# Patient Record
Sex: FEMALE | Race: WHITE | Employment: PART TIME | URBAN - NONMETROPOLITAN AREA
[De-identification: names, ages, dates, MRNs, and addresses within clinical notes are randomized per-mention and may not be internally consistent; named-entity substitution may affect disease eponyms.]

---

## 2017-04-28 ENCOUNTER — TRANSFERRED RECORDS (OUTPATIENT)
Dept: HEALTH INFORMATION MANAGEMENT | Facility: HOSPITAL | Age: 52
End: 2017-04-28

## 2017-11-09 DIAGNOSIS — R39.15 URINARY URGENCY: ICD-10-CM

## 2017-11-09 NOTE — TELEPHONE ENCOUNTER
ciprofloxacin      Last Written Prescription Date: 10/17/16  Last Fill Quantity: 12,  # refills: 3   Last Office Visit with G, [facility] or [facility] prescribing provider: 10/17/16

## 2017-11-13 RX ORDER — CIPROFLOXACIN 250 MG/1
TABLET, FILM COATED ORAL
Qty: 12 TABLET | Refills: 3 | Status: SHIPPED | OUTPATIENT
Start: 2017-11-13 | End: 2019-01-21

## 2017-11-13 NOTE — TELEPHONE ENCOUNTER
Patient is due for annual fu.  Ciprofloxacin        Routing refill request to provider for review/approval because:  Drug not on the [facility], [facility] or [facility] refill protocol or controlled substance

## 2017-11-20 DIAGNOSIS — E03.9 ACQUIRED HYPOTHYROIDISM: ICD-10-CM

## 2017-11-20 DIAGNOSIS — F41.9 ANXIETY: ICD-10-CM

## 2017-11-21 RX ORDER — CITALOPRAM HYDROBROMIDE 40 MG/1
TABLET ORAL
Qty: 30 TABLET | Refills: 0 | Status: SHIPPED | OUTPATIENT
Start: 2017-11-21 | End: 2017-12-30

## 2017-11-21 RX ORDER — LEVOTHYROXINE SODIUM 75 UG/1
TABLET ORAL
Qty: 30 TABLET | Refills: 11 | Status: SHIPPED | OUTPATIENT
Start: 2017-11-21 | End: 2018-01-18

## 2017-11-21 NOTE — TELEPHONE ENCOUNTER
Refill request for Synthroid  Last refill 11/10/16 , qty #30 with 11 refills  Last office visit 10/2016 with TSH    Patient is due for fu visit and lab:  TSH    Thank you, Dr. Kruger for addressing this for Rosetta Suarez who is out of the office today.

## 2017-11-26 ENCOUNTER — HEALTH MAINTENANCE LETTER (OUTPATIENT)
Age: 52
End: 2017-11-26

## 2017-12-30 DIAGNOSIS — F41.9 ANXIETY: ICD-10-CM

## 2018-01-02 NOTE — TELEPHONE ENCOUNTER
celexa  Last Written Prescription Date: 11/21/17  Last Fill Quantity: 30,  # refills: 0   Last Office Visit with G, [facility] or [facility] prescribing provider: 10/17/16

## 2018-01-03 RX ORDER — CITALOPRAM HYDROBROMIDE 40 MG/1
TABLET ORAL
Qty: 30 TABLET | Refills: 0 | Status: SHIPPED | OUTPATIENT
Start: 2018-01-03 | End: 2018-01-18

## 2018-01-03 NOTE — TELEPHONE ENCOUNTER
Donnella  Please see note below.  Patient due for office visit and phq.  PHQ-9 SCORE 11/13/2014 10/1/2015 10/17/2016   Total Score 11 - -   Total Score - 10 9     Please advise.  Thank you.

## 2018-01-08 ENCOUNTER — TELEPHONE (OUTPATIENT)
Dept: FAMILY MEDICINE | Facility: OTHER | Age: 53
End: 2018-01-08

## 2018-01-08 NOTE — TELEPHONE ENCOUNTER
Left message to call pharmacy for refill, will see upcoming appt scheduled and refill will not be an issue.  Sana Jansen LPN

## 2018-01-08 NOTE — TELEPHONE ENCOUNTER
8:31 AM    Reason for Call: Phone Call    Description:  Patient would like to see if she can get her rx for her thyroid and anti depressant pills refilled - she made an appt for 1/18/18 at 2.    Was an appointment offered for this call? Yes  If yes : Appointment type              Date    Preferred method for responding to this message: Telephone Call  What is your phone number ? [phone]    If we cannot reach you directly, may we leave a detailed response at the number you provided? Yes    Can this message wait until your PCP/provider returns, if available today? YES,     Rita Simons    \

## 2018-01-18 ENCOUNTER — OFFICE VISIT (OUTPATIENT)
Dept: FAMILY MEDICINE | Facility: OTHER | Age: 53
End: 2018-01-18
Attending: PHYSICIAN ASSISTANT
Payer: COMMERCIAL

## 2018-01-18 VITALS
DIASTOLIC BLOOD PRESSURE: 76 MMHG | HEART RATE: 70 BPM | SYSTOLIC BLOOD PRESSURE: 124 MMHG | BODY MASS INDEX: 33.64 KG/M2 | TEMPERATURE: 98.6 F | WEIGHT: 218 LBS | OXYGEN SATURATION: 97 %

## 2018-01-18 DIAGNOSIS — E03.9 ACQUIRED HYPOTHYROIDISM: ICD-10-CM

## 2018-01-18 DIAGNOSIS — M19.90 OSTEOARTHRITIS, UNSPECIFIED OSTEOARTHRITIS TYPE, UNSPECIFIED SITE: ICD-10-CM

## 2018-01-18 DIAGNOSIS — F33.1 MODERATE EPISODE OF RECURRENT MAJOR DEPRESSIVE DISORDER (H): Primary | ICD-10-CM

## 2018-01-18 DIAGNOSIS — Z79.811 LONG TERM CURRENT USE OF AROMATASE INHIBITOR: ICD-10-CM

## 2018-01-18 LAB
ALBUMIN SERPL-MCNC: 3.6 G/DL (ref 3.4–5)
ALP SERPL-CCNC: 141 U/L (ref 40–150)
ALT SERPL W P-5'-P-CCNC: 28 U/L (ref 0–50)
ANION GAP SERPL CALCULATED.3IONS-SCNC: 7 MMOL/L (ref 3–14)
AST SERPL W P-5'-P-CCNC: 22 U/L (ref 0–45)
BASOPHILS # BLD AUTO: 0 10E9/L (ref 0–0.2)
BASOPHILS NFR BLD AUTO: 0.2 %
BILIRUB SERPL-MCNC: 0.2 MG/DL (ref 0.2–1.3)
BUN SERPL-MCNC: 9 MG/DL (ref 7–30)
CALCIUM SERPL-MCNC: 8.4 MG/DL (ref 8.5–10.1)
CHLORIDE SERPL-SCNC: 105 MMOL/L (ref 94–109)
CO2 SERPL-SCNC: 30 MMOL/L (ref 20–32)
CREAT SERPL-MCNC: 0.65 MG/DL (ref 0.52–1.04)
DIFFERENTIAL METHOD BLD: NORMAL
EOSINOPHIL # BLD AUTO: 0.1 10E9/L (ref 0–0.7)
EOSINOPHIL NFR BLD AUTO: 1.5 %
ERYTHROCYTE [DISTWIDTH] IN BLOOD BY AUTOMATED COUNT: 11.9 % (ref 10–15)
GFR SERPL CREATININE-BSD FRML MDRD: >90 ML/MIN/1.7M2
GLUCOSE SERPL-MCNC: 79 MG/DL (ref 70–99)
HCT VFR BLD AUTO: 36 % (ref 35–47)
HGB BLD-MCNC: 12.5 G/DL (ref 11.7–15.7)
IMM GRANULOCYTES # BLD: 0 10E9/L (ref 0–0.4)
IMM GRANULOCYTES NFR BLD: 0.3 %
LYMPHOCYTES # BLD AUTO: 2.4 10E9/L (ref 0.8–5.3)
LYMPHOCYTES NFR BLD AUTO: 39.2 %
MCH RBC QN AUTO: 31.3 PG (ref 26.5–33)
MCHC RBC AUTO-ENTMCNC: 34.7 G/DL (ref 31.5–36.5)
MCV RBC AUTO: 90 FL (ref 78–100)
MONOCYTES # BLD AUTO: 0.5 10E9/L (ref 0–1.3)
MONOCYTES NFR BLD AUTO: 8 %
NEUTROPHILS # BLD AUTO: 3.1 10E9/L (ref 1.6–8.3)
NEUTROPHILS NFR BLD AUTO: 50.8 %
NRBC # BLD AUTO: 0 10*3/UL
NRBC BLD AUTO-RTO: 0 /100
PLATELET # BLD AUTO: 225 10E9/L (ref 150–450)
POTASSIUM SERPL-SCNC: 3.7 MMOL/L (ref 3.4–5.3)
PROT SERPL-MCNC: 7.3 G/DL (ref 6.8–8.8)
RBC # BLD AUTO: 3.99 10E12/L (ref 3.8–5.2)
SODIUM SERPL-SCNC: 142 MMOL/L (ref 133–144)
TSH SERPL DL<=0.005 MIU/L-ACNC: 2.46 MU/L (ref 0.4–4)
WBC # BLD AUTO: 6.1 10E9/L (ref 4–11)

## 2018-01-18 PROCEDURE — 36415 COLL VENOUS BLD VENIPUNCTURE: CPT | Performed by: PHYSICIAN ASSISTANT

## 2018-01-18 PROCEDURE — 99214 OFFICE O/P EST MOD 30 MIN: CPT | Performed by: PHYSICIAN ASSISTANT

## 2018-01-18 PROCEDURE — 80050 GENERAL HEALTH PANEL: CPT | Performed by: PHYSICIAN ASSISTANT

## 2018-01-18 RX ORDER — TRAZODONE HYDROCHLORIDE 50 MG/1
50 TABLET, FILM COATED ORAL
Qty: 90 TABLET | Refills: 1 | Status: SHIPPED | OUTPATIENT
Start: 2018-01-18 | End: 2018-06-27

## 2018-01-18 RX ORDER — LEVOTHYROXINE SODIUM 75 UG/1
TABLET ORAL
Qty: 30 TABLET | Refills: 11 | Status: SHIPPED | OUTPATIENT
Start: 2018-01-18 | End: 2018-06-27

## 2018-01-18 RX ORDER — ESCITALOPRAM OXALATE 20 MG/1
40 TABLET ORAL DAILY
Qty: 180 TABLET | Refills: 1 | Status: SHIPPED | OUTPATIENT
Start: 2018-01-18 | End: 2018-06-27

## 2018-01-18 ASSESSMENT — ANXIETY QUESTIONNAIRES
1. FEELING NERVOUS, ANXIOUS, OR ON EDGE: NEARLY EVERY DAY
5. BEING SO RESTLESS THAT IT IS HARD TO SIT STILL: MORE THAN HALF THE DAYS
7. FEELING AFRAID AS IF SOMETHING AWFUL MIGHT HAPPEN: SEVERAL DAYS
GAD7 TOTAL SCORE: 13
IF YOU CHECKED OFF ANY PROBLEMS ON THIS QUESTIONNAIRE, HOW DIFFICULT HAVE THESE PROBLEMS MADE IT FOR YOU TO DO YOUR WORK, TAKE CARE OF THINGS AT HOME, OR GET ALONG WITH OTHER PEOPLE: VERY DIFFICULT
3. WORRYING TOO MUCH ABOUT DIFFERENT THINGS: SEVERAL DAYS
2. NOT BEING ABLE TO STOP OR CONTROL WORRYING: MORE THAN HALF THE DAYS
6. BECOMING EASILY ANNOYED OR IRRITABLE: MORE THAN HALF THE DAYS
4. TROUBLE RELAXING: MORE THAN HALF THE DAYS

## 2018-01-18 ASSESSMENT — PATIENT HEALTH QUESTIONNAIRE - PHQ9: SUM OF ALL RESPONSES TO PHQ QUESTIONS 1-9: 17

## 2018-01-18 ASSESSMENT — PAIN SCALES - GENERAL: PAINLEVEL: NO PAIN (0)

## 2018-01-18 NOTE — MR AVS SNAPSHOT
After Visit Summary   1/18/2018    Izzy Shultz    MRN: [id number]           Patient Information     Date Of Birth          1965        Visit Information        Provider Department      1/18/2018 2:15 PM Rosetta Suarez PA [facility]        Today's Diagnoses     Moderate episode of recurrent major depressive disorder (H)    -  1    Acquired hypothyroidism        Osteoarthritis, unspecified osteoarthritis type, unspecified site        Long term current use of aromatase inhibitor          Care Instructions      Thank you for choosing [facility].   I have office hours 8:00 am to 4:30 pm on Monday's, Wednesday's, Thursday's and Friday's. My nurse and I are out of the office every Tuesday.    Following your visit, when your labs and diagnostic testing have returned, I will review then and you will be contacted by my nurse.  If you are on My Chart, you can also view results there.    For refills, notify your pharmacy regarding what you need and the pharmacy will generate a refill request. Do not call my nurse as she is unable to process refill request. Please plan ahead and allow 3-5 days for refill requests.    You will generally receive a reminder call the day prior to your appointment.  If you cannot attend your appointment, please cancel your appointment with as much notice as possible.  If there is a pattern of failure to present for your appointments, I cannot provide consistent, meaningful, ongoing care for you. It is very important to me that you come in for your care, so we can best assist you with your health care needs.    IMPORTANT:  Please note that it is my standard of practice to NOT participate in prescribing ongoing requested Narcotic Analgesic therapy, and/or participate in the prescribing of other controlled substances.  My nurse and I am happy to assist you with the process of referral for alternative pain management as needed, and other treatment  modalities including but not limited to:  Physical Therapy, Physical Medicine and Rehab, Counseling, Chiropractic Care, Orthopedic Care, and non-narcotic medication management.     In the event that you need to be seen for emergent concerns and I am out of office,  please see one of my colleagues for acute concerns.  You may also present to  or ER.  I appreciate the opportunity to serve you and look forward to supporting your healthcare needs in the future. Please contact me with any questions or concerns that you may have.    Sincerely,      Rosetta Suarez RN, PA-C               Follow-ups after your visit        Who to contact     If you have questions or need follow up information about today's clinic visit or your schedule please contact [facility] directly at [phone].  Normal or non-critical lab and imaging results will be communicated to you by MyChart, letter or phone within 4 business days after the clinic has received the results. If you do not hear from us within 7 days, please contact the clinic through [employer] or phone. If you have a critical or abnormal lab result, we will notify you by phone as soon as possible.  Submit refill requests through [employer] or call your pharmacy and they will forward the refill request to us. Please allow 3 business days for your refill to be completed.          Additional Information About Your Visit        [employer] Information     [employer] gives you secure access to your electronic health record. If you see a primary care provider, you can also send messages to your care team and make appointments. If you have questions, please call your primary care clinic.  If you do not have a primary care provider, please call [phone] and they will assist you.        Care EveryWhere ID     This is your Care EveryWhere ID. This could be used by other organizations to access your [place] medical records  [id number]        Your Vitals Were     Pulse Temperature  Pulse Oximetry Breastfeeding? BMI (Body Mass Index)       70 98.6  F (37  C) (Tympanic) 97% No 33.64 kg/m2        Blood Pressure from Last 3 Encounters:   01/18/18 124/76   12/05/16 138/77   10/17/16 108/70    Weight from Last 3 Encounters:   01/18/18 218 lb (98.9 kg)   10/17/16 223 lb (101.2 kg)   10/03/16 223 lb (101.2 kg)              We Performed the Following     CBC with platelets and differential     Comprehensive metabolic panel (BMP + Alb, Alk Phos, ALT, AST, Total. Bili, TP)     TSH with free T4 reflex          Today's Medication Changes          These changes are accurate as of: 1/18/18  3:51 PM.  If you have any questions, ask your nurse or doctor.               Start taking these medicines.        Dose/Directions    escitalopram 20 MG tablet   Commonly known as:  LEXAPRO   Used for:  Moderate episode of recurrent major depressive disorder (H)   Started by:  Rosetta Suarez PA        Dose:  40 mg   Take 2 tablets (40 mg) by mouth daily   Quantity:  180 tablet   Refills:  1       traZODone 50 MG tablet   Commonly known as:  DESYREL   Used for:  Moderate episode of recurrent major depressive disorder (H)   Started by:  Rosetta Suarez PA        Dose:  50 mg   Take 1 tablet (50 mg) by mouth nightly as needed for sleep   Quantity:  90 tablet   Refills:  1         Stop taking these medicines if you haven't already. Please contact your care team if you have questions.     citalopram 40 MG tablet   Commonly known as:  celeXA   Stopped by:  Rosetta Suarez PA                Where to get your medicines      These medications were sent to [facility] - [place], [place] - [address]  [address] [address]     Phone:  [phone]     escitalopram 20 MG tablet    traZODone 50 MG tablet                Primary Care Provider Office Phone # Fax #    JOSE Raoms [phone]       [facility] [address] [place] [address]        Equal Access to  Services     [facility]: Hadii aad ku hadhusambalbir Lucynick, wabeatriceda luqadaha, qaybta kaalmamary denny, moses floyd . So [facility] [phone].    ATENCIÓN: Si mitchell lea, tiene a lang disposición servicios gratuitos de asistencia lingüística. Llame al [phone].    We comply with applicable federal civil rights laws and [place] laws. We do not discriminate on the basis of race, color, national origin, age, disability, sex, sexual orientation, or gender identity.            Thank you!     Thank you for choosing [facility]  for your care. Our goal is always to provide you with excellent care. Hearing back from our patients is one way we can continue to improve our services. Please take a few minutes to complete the written survey that you may receive in the mail after your visit with us. Thank you!             Your Updated Medication List - Protect others around you: Learn how to safely use, store and throw away your medicines at www.disposemymeds.org.          This list is accurate as of: 1/18/18  3:51 PM.  Always use your most recent med list.                   Brand Name Dispense Instructions for use Diagnosis    anastrozole 1 MG tablet    ARIMIDEX     Take 1 mg by mouth daily        CALCIUM + D + K PO      Take  by mouth. 3 pills daily        ciprofloxacin 250 MG tablet    CIPRO    12 tablet    TAKE ONE TABLET BY MOUTH POST COITAL    Urinary urgency       escitalopram 20 MG tablet    LEXAPRO    180 tablet    Take 2 tablets (40 mg) by mouth daily    Moderate episode of recurrent major depressive disorder (H)       ferrous sulfate 325 (65 FE) MG tablet    IRON     Take 325 mg by mouth daily (with breakfast)        ibuprofen 800 MG tablet    ADVIL/MOTRIN    30 tablet    Take 1 tablet (800 mg) by mouth every 8 hours as needed for pain    Osteoarthritis, unspecified osteoarthritis type, unspecified site       IRON 100 PLUS PO      Take  by mouth.        levothyroxine 75 MCG  tablet    SYNTHROID/LEVOTHROID    30 tablet    TAKE 1 TABLET BY MOUTH EVERY DAY ON AN EMPTY STOMACH    Acquired hypothyroidism       LORazepam 0.5 MG tablet    ATIVAN    20 tablet    Take 1 tablet (0.5 mg) by mouth nightly as needed for other (sleep)    Primary insomnia       MULTIVITAMIN PO      Take  by mouth.        Na Sulfate-K Sulfate-Mg Sulf solution    SUPREP BOWEL PREP    2 Bottle    Take 177 mLs (1 Bottle) by mouth 2 times daily        traZODone 50 MG tablet    DESYREL    90 tablet    Take 1 tablet (50 mg) by mouth nightly as needed for sleep    Moderate episode of recurrent major depressive disorder (H)       VITAMIN B 12 PO      Take  by mouth.        vitamin D 2000 UNITS tablet      Take 2,000 Units by mouth daily

## 2018-01-18 NOTE — NURSING NOTE
"Chief Complaint   Patient presents with     Anxiety     Depression     Thyroid Problem       Initial /76 (BP Location: Right arm, Patient Position: Chair, Cuff Size: Adult Large)  Pulse 70  Temp 98.6  F (37  C) (Tympanic)  Wt 218 lb (98.9 kg)  SpO2 97%  Breastfeeding? No  BMI 33.64 kg/m2 Estimated body mass index is 33.64 kg/(m^2) as calculated from the following:    Height as of 10/17/16: 5' 7.5\" (1.715 m).    Weight as of this encounter: 218 lb (98.9 kg).  Medication Reconciliation: complete   Denise Vick CMA(AAMA)   "

## 2018-01-18 NOTE — PATIENT INSTRUCTIONS
Thank you for choosing [facility].   I have office hours 8:00 am to 4:30 pm on Monday's, Wednesday's, Thursday's and Friday's. My nurse and I are out of the office every Tuesday.    Following your visit, when your labs and diagnostic testing have returned, I will review then and you will be contacted by my nurse.  If you are on My Chart, you can also view results there.    For refills, notify your pharmacy regarding what you need and the pharmacy will generate a refill request. Do not call my nurse as she is unable to process refill request. Please plan ahead and allow 3-5 days for refill requests.    You will generally receive a reminder call the day prior to your appointment.  If you cannot attend your appointment, please cancel your appointment with as much notice as possible.  If there is a pattern of failure to present for your appointments, I cannot provide consistent, meaningful, ongoing care for you. It is very important to me that you come in for your care, so we can best assist you with your health care needs.    IMPORTANT:  Please note that it is my standard of practice to NOT participate in prescribing ongoing requested Narcotic Analgesic therapy, and/or participate in the prescribing of other controlled substances.  My nurse and I am happy to assist you with the process of referral for alternative pain management as needed, and other treatment modalities including but not limited to:  Physical Therapy, Physical Medicine and Rehab, Counseling, Chiropractic Care, Orthopedic Care, and non-narcotic medication management.     In the event that you need to be seen for emergent concerns and I am out of office,  please see one of my colleagues for acute concerns.  You may also present to  or ER.  I appreciate the opportunity to serve you and look forward to supporting your healthcare needs in the future. Please contact me with any questions or concerns that you may  have.    Sincerely,      Rosetta Suarez RN, PA-C

## 2018-01-18 NOTE — PROGRESS NOTES
SUBJECTIVE:   Izzy Shultz is a 52 year old female who presents to clinic today for the following health issues:        Depression and Anxiety Follow-Up    Status since last visit: Worsened     Other associated symptoms:None    Complicating factors:     Significant life event: No     Current substance abuse: None    PHQ-9 Score and MyChart F/U Questions 10/1/2015 10/17/2016 1/18/2018   Total Score 10 9 17   Q9: Suicide Ideation Not at all Not at all Not at all     MITCH-7 SCORE 1/18/2018   Total Score 13       PHQ-9  English  PHQ-9   Any Language  GAD7  Suicide Assessment Five-step Evaluation and Treatment (SAFE-T)      Amount of exercise or physical activity: everyday activity     Problems taking medications regularly: No    Medication side effects: none    Diet: regular (no restrictions)        Hypothyroidism Follow-up      Since last visit, patient describes the following symptoms: Weight stable, no hair loss, no skin changes, no constipation, no loose stools        Problem list and histories reviewed & adjusted, as indicated.  Additional history: as documented    Patient Active Problem List   Diagnosis     Obesity     Menorrhagia     Hypothyroidism     H/O partial thyroidectomy     Osteoarthritis     Overweight     Headache     Edema     Depression     Use of aromatase inhibitors     Disorder of joint     Long term current use of aromatase inhibitor     ACP (advance care planning)     Past Surgical History:   Procedure Laterality Date     COLONOSCOPY N/A 12/5/2016    Procedure: COLONOSCOPY;  Surgeon: Ju Valentino MD;  Location: [facility] OR     D & C  1996     ENT SURGERY      tubes     gastric bypass  2009     HYSTEROSCOPY, ABLATE ENDOMETRIUM HYDROTHERMAL, COMBINED  7/31/2013    Procedure: COMBINED HYSTEROSCOPY, ABLATE ENDOMETRIUM HYDROTHERMAL;  HYSTEROSCOPY, ENDOMETRIAL ABLATION;  Surgeon: Vasquez Barahona MD;  Location: [facility] OR     knee laparoscopy  1989    bilateral     lap band  2007     MANDIBLE SURGERY       done to severe overbite - [place]in [place]     MASTECTOMY, BILATERAL        x2       right thyroid lobectomy and isthmus  2012     TONSILLECTOMY      adenoidectomy       Social History   Substance Use Topics     Smoking status: Never Smoker     Smokeless tobacco: Never Used      Comment: no passive exposure     Alcohol use Yes      Comment: rarely     Family History   Problem Relation Age of Onset     CANCER Father      lung     CANCER Mother      thyroid     DIABETES Mother      Other - See Comments Mother      AAA/hyperlipidemia     Hypertension Mother      Hypertension Father      Hypertension Sister      Thyroid Disease Sister      Endocrine Disease Brother      thyroid     Other - See Comments Brother      sleep apnea     Family History Negative Sister      Hypertension Brother          Current Outpatient Prescriptions   Medication Sig Dispense Refill     citalopram (CELEXA) 40 MG tablet TAKE ONE TABLET BY MOUTH EVERY DAY 30 tablet 0     levothyroxine (SYNTHROID/LEVOTHROID) 75 MCG tablet TAKE 1 TABLET BY MOUTH EVERY DAY ON AN EMPTY STOMACH 30 tablet 11     ciprofloxacin (CIPRO) 250 MG tablet TAKE ONE TABLET BY MOUTH POST COITAL 12 tablet 3     ibuprofen (ADVIL,MOTRIN) 800 MG tablet Take 1 tablet (800 mg) by mouth every 8 hours as needed for pain 30 tablet 1     LORazepam (ATIVAN) 0.5 MG tablet Take 1 tablet (0.5 mg) by mouth nightly as needed for other (sleep) 20 tablet 0     Na Sulfate-K Sulfate-Mg Sulf (SUPREP BOWEL PREP) solution Take 177 mLs (1 Bottle) by mouth 2 times daily 2 Bottle 0     ferrous sulfate (IRON) 325 (65 FE) MG tablet Take 325 mg by mouth daily (with breakfast)        anastrozole (ARIMIDEX) 1 MG tablet Take 1 mg by mouth daily        Cholecalciferol (VITAMIN D) 2000 UNITS tablet Take 2,000 Units by mouth daily        Multiple Vitamins-Minerals (MULTIVITAMIN OR) Take  by mouth.       Iron-Vit C-Vit B12-Folic Acid (IRON 100 PLUS PO) Take  by mouth.       Calcium-Vitamin D-Vitamin K  (CALCIUM + D + K PO) Take  by mouth. 3 pills daily       Cyanocobalamin (VITAMIN B 12 PO) Take  by mouth.       Allergies   Allergen Reactions     Tylox [Oxycodone-Acetaminophen] Anaphylaxis     Hives all over, throat swells     Cats      Congestion     Codeine      Throat swells.  No reaction to SQ hydromorphone x 1 in OR 11/4/14     BP Readings from Last 3 Encounters:   01/18/18 124/76   12/05/16 138/77   10/17/16 108/70    Wt Readings from Last 3 Encounters:   01/18/18 218 lb (98.9 kg)   10/17/16 223 lb (101.2 kg)   10/03/16 223 lb (101.2 kg)                        Review[facility] and [facility]  Meds       Reviewed and updated as needed this visit by Provider         ROS:  Constitutional, neuro, ENT, endocrine, pulmonary, cardiac, gastrointestinal, genitourinary, musculoskeletal, integument and psychiatric systems are negative, except as otherwise noted.      OBJECTIVE:                                                    /76 (BP Location: Right arm, Patient Position: Chair, Cuff Size: Adult Large)  Pulse 70  Temp 98.6  F (37  C) (Tympanic)  Wt 218 lb (98.9 kg)  SpO2 97%  Breastfeeding? No  BMI 33.64 kg/m2  Body mass index is 33.64 kg/(m^2).  GENERAL APPEARANCE: healthy, alert and no distress  EYES: Eyes grossly normal to inspection, PERRL and conjunctivae and sclerae normal  HENT: ear canals and TM's normal and nose and mouth without ulcers or lesions  NECK: no adenopathy, no asymmetry, masses, or scars and thyroid normal to palpation  RESP: lungs clear to auscultation - no rales, rhonchi or wheezes  CV: regular rates and rhythm, normal S1 S2, no S3 or S4 and no murmur, click or rub  LYMPHATICS: normal ant/post cervical and supraclavicular nodes  ABDOMEN: soft, nontender, without hepatosplenomegaly or masses and bowel sounds normal  MS: extremities normal- no gross deformities noted  SKIN: no suspicious lesions or rashes  NEURO: Normal strength and tone,  mentation intact and speech normal  PSYCH: mentation appears normal and affect normal/bright. Long discussion on stressors. PHQ and MITCH scores.  On the edge, not sleeping. Addressed today.     Diagnostic test results:  Diagnostic Test Results:  Results for orders placed or performed in visit on 01/18/18 (from the past 24 hour(s))   TSH with free T4 reflex   Result Value Ref Range    TSH 2.46 0.40 - 4.00 mU/L   CBC with platelets and differential   Result Value Ref Range    WBC 6.1 4.0 - 11.0 10e9/L    RBC Count 3.99 3.8 - 5.2 10e12/L    Hemoglobin 12.5 11.7 - 15.7 g/dL    Hematocrit 36.0 35.0 - 47.0 %    MCV 90 78 - 100 fl    MCH 31.3 26.5 - 33.0 pg    MCHC 34.7 31.5 - 36.5 g/dL    RDW 11.9 10.0 - 15.0 %    Platelet Count 225 150 - 450 10e9/L    Diff Method Automated Method     % Neutrophils 50.8 %    % Lymphocytes 39.2 %    % Monocytes 8.0 %    % Eosinophils 1.5 %    % Basophils 0.2 %    % Immature Granulocytes 0.3 %    Nucleated RBCs 0 0 /100    Absolute Neutrophil 3.1 1.6 - 8.3 10e9/L    Absolute Lymphocytes 2.4 0.8 - 5.3 10e9/L    Absolute Monocytes 0.5 0.0 - 1.3 10e9/L    Absolute Eosinophils 0.1 0.0 - 0.7 10e9/L    Absolute Basophils 0.0 0.0 - 0.2 10e9/L    Abs Immature Granulocytes 0.0 0 - 0.4 10e9/L    Absolute Nucleated RBC 0.0    Comprehensive metabolic panel (BMP + Alb, Alk Phos, ALT, AST, Total. Bili, TP)   Result Value Ref Range    Sodium 142 133 - 144 mmol/L    Potassium 3.7 3.4 - 5.3 mmol/L    Chloride 105 94 - 109 mmol/L    Carbon Dioxide 30 20 - 32 mmol/L    Anion Gap 7 3 - 14 mmol/L    Glucose 79 70 - 99 mg/dL    Urea Nitrogen 9 7 - 30 mg/dL    Creatinine 0.65 0.52 - 1.04 mg/dL    GFR Estimate >90 >60 mL/min/1.7m2    GFR Estimate If Black >90 >60 mL/min/1.7m2    Calcium 8.4 (L) 8.5 - 10.1 mg/dL    Bilirubin Total 0.2 0.2 - 1.3 mg/dL    Albumin 3.6 3.4 - 5.0 g/dL    Protein Total 7.3 6.8 - 8.8 g/dL    Alkaline Phosphatase 141 40 - 150 U/L    ALT 28 0 - 50 U/L    AST 22 0 - 45 U/L           ASSESSMENT/PLAN:                                                    .1. Moderate episode of recurrent major depressive disorder (H)  New job, new changes,  with a job change.  She is dealing with a lot.  Change in medications.  Will let me know how things are going.   - TSH with free T4 reflex  - escitalopram (LEXAPRO) 20 MG tablet; Take 2 tablets (40 mg) by mouth daily  Dispense: 180 tablet; Refill: 1  - traZODone (DESYREL) 50 MG tablet; Take 1 tablet (50 mg) by mouth nightly as needed for sleep  Dispense: 90 tablet; Refill: 1    2. Acquired hypothyroidism  Recheck . On medications If in range ok for one year.   - TSH with free T4 reflex    3. Osteoarthritis, unspecified osteoarthritis type, unspecified site  Chronic discomfort.  taking NSAID for this. With relief.   - CBC with platelets and differential  - Comprehensive metabolic panel (BMP + Alb, Alk Phos, ALT, AST, Total. Bili, TP)    4. Long term current use of aromatase inhibitor  Seeing on oncology every 3 months and moving up to 6 months.       See Patient Instructions    JOSE Madera  [facility]ON

## 2018-01-19 ASSESSMENT — ANXIETY QUESTIONNAIRES: GAD7 TOTAL SCORE: 13

## 2018-02-05 ENCOUNTER — TELEPHONE (OUTPATIENT)
Dept: FAMILY MEDICINE | Facility: OTHER | Age: 53
End: 2018-02-05

## 2018-02-05 NOTE — TELEPHONE ENCOUNTER
Called [facility]; message left that PA was denied.  Will have to look at options.  Rita Solorio

## 2018-02-05 NOTE — TELEPHONE ENCOUNTER
3:03 PM    Reason for Call: Phone Call    Description: Reji from [facility] pharmacy is calling in to see if we have the PA for the rx escitalopram.    Was an appointment offered for this call? No  If yes : Appointment type              Date    Preferred method for responding to this message: Telephone Call  What is your phone number ? [phone]    If we cannot reach you directly, may we leave a detailed response at the number you provided? Yes    Can this message wait until your PCP/provider returns, if available today? YES,     Rita Simons

## 2018-02-07 ENCOUNTER — TELEPHONE (OUTPATIENT)
Dept: FAMILY MEDICINE | Facility: OTHER | Age: 53
End: 2018-02-07

## 2018-02-07 DIAGNOSIS — F33.1 MODERATE EPISODE OF RECURRENT MAJOR DEPRESSIVE DISORDER (H): Primary | ICD-10-CM

## 2018-02-07 RX ORDER — ESCITALOPRAM OXALATE 20 MG/1
20 TABLET ORAL DAILY
Qty: 90 TABLET | Refills: 1 | Status: SHIPPED | OUTPATIENT
Start: 2018-02-07 | End: 2018-11-12

## 2018-02-07 RX ORDER — CITALOPRAM HYDROBROMIDE 20 MG/1
20 TABLET ORAL DAILY
Qty: 90 TABLET | Refills: 1 | Status: SHIPPED | OUTPATIENT
Start: 2018-02-07 | End: 2019-03-04

## 2018-02-07 NOTE — TELEPHONE ENCOUNTER
Response to prior auth on Lexapro; denied by [facility] as there is no documentation of pt receiving starting dose of 20mg daily.  Per Rosetta Suarez, pt can have Lexapro 20 mg; add Celexa 20 mg daily.  Pt left phone message; will get orders ready for CWallis to sign/route to pharmacy.  Rita Solorio

## 2018-02-08 ENCOUNTER — TELEPHONE (OUTPATIENT)
Dept: FAMILY MEDICINE | Facility: OTHER | Age: 53
End: 2018-02-08

## 2018-02-08 NOTE — TELEPHONE ENCOUNTER
Pt notified of the Celexa 20 mg/Lexapro 20 mg in substitution of Lexapro 40 mg daily that insurance denied coverage for.  She is ok with the change.  Rita Solorio

## 2018-02-08 NOTE — TELEPHONE ENCOUNTER
12:43 PM    Reason for Call: Phone Call    Description: Patient is returning a call from the nurse who called yesterday. If she could please get a call back today.     Was an appointment offered for this call? No  If yes : Appointment type              Date    Preferred method for responding to this message: Telephone Call  What is your phone number ? [phone]    If we cannot reach you directly, may we leave a detailed response at the number you provided? Yes    Can this message wait until your PCP/provider returns, if available today? YES,     Rita Simons

## 2018-02-15 ENCOUNTER — TELEPHONE (OUTPATIENT)
Dept: FAMILY MEDICINE | Facility: OTHER | Age: 53
End: 2018-02-15

## 2018-06-18 ENCOUNTER — TELEPHONE (OUTPATIENT)
Dept: FAMILY MEDICINE | Facility: OTHER | Age: 53
End: 2018-06-18

## 2018-06-18 NOTE — TELEPHONE ENCOUNTER
Please offer 4:00 appt with Rosetta Suarez on 6/27. Please make this a 40 min visit.  Sana Jansen LPN

## 2018-06-18 NOTE — TELEPHONE ENCOUNTER
10:58 AM    Reason for Call: OVERBOOK    Patient is having the following symptoms: Med check    The patient is requesting an appointment for before end of June with Rosetta Suarez (pt will lose ins at end of June)    Was an appointment offered for this call? Yes  If yes : Appointment type short               Date  07/02/18    Preferred method for responding to this message: Telephone Call  What is your phone number ?  [phone]    If we cannot reach you directly, may we leave a detailed response at the number you provided? Yes    Can this message wait until your PCP/provider returns, if unavailable today? Not applicable    Amparo Keenan

## 2018-06-20 PROBLEM — M85.80 OSTEOPENIA: Status: ACTIVE | Noted: 2017-04-28

## 2018-06-20 NOTE — PROGRESS NOTES
SUBJECTIVE:   Izzy Shultz is a 53 year old female who presents to clinic today for the following health issues:    Depression and Anxiety Follow-Up    Status since last visit: No change    Other associated symptoms:None    Complicating factors:     Significant life event: No     Current substance abuse: None    PHQ-9 10/17/2016 1/18/2018 6/27/2018   Total Score 9 17 14   Q9: Suicide Ideation Not at all Not at all Not at all     MITCH-7 SCORE 1/18/2018 6/27/2018   Total Score 13 8       PHQ-9  English  PHQ-9   Any Language  MITCH-7  Suicide Assessment Five-step Evaluation and Treatment (SAFE-T)  Hypothyroidism Follow-up      Since last visit, patient describes the following symptoms: Weight stable, no hair loss, no skin changes, no constipation, no loose stools      Amount of exercise or physical activity: Doing 8 to 59242 steps a day.    Problems taking medications regularly: No    Medication side effects: none    Diet: regular (no restrictions)            Problem list and histories reviewed & adjusted, as indicated.  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)     Osteopenia     Status post bariatric surgery     Past Surgical History:   Procedure Laterality Date     COLONOSCOPY N/A 12/5/2016    Procedure: COLONOSCOPY;  Surgeon: Ju Valentino MD;  Location: [facility]     D & C  1996     ENT SURGERY      tubes     gastric bypass  2009     HYSTEROSCOPY, ABLATE ENDOMETRIUM HYDROTHERMAL, COMBINED  7/31/2013    Procedure: COMBINED HYSTEROSCOPY, ABLATE ENDOMETRIUM HYDROTHERMAL;  HYSTEROSCOPY, ENDOMETRIAL ABLATION;  Surgeon: Jun  Vasquez BLOUNT MD;  Location: [facility] OR     knee laparoscopy      bilateral     lap band       MANDIBLE SURGERY      done to severe overbite - -in [place]     MASTECTOMY, BILATERAL Bilateral       x2       right thyroid lobectomy and isthmus  2012     TONSILLECTOMY      adenoidectomy       Social History   Substance Use Topics     Smoking status: Never Smoker     Smokeless tobacco: Never Used      Comment: no passive exposure     Alcohol use Yes      Comment: rarely     Family History   Problem Relation Age of Onset     Cancer Father      lung     Hypertension Father      Cancer Mother      thyroid     Diabetes Mother      Other - See Comments Mother      AAA/hyperlipidemia     Hypertension Mother      Hypertension Sister      Thyroid Disease Sister      Endocrine Disease Brother      thyroid     Other - See Comments Brother      sleep apnea     Family History Negative Sister      Hypertension Brother          Current Outpatient Prescriptions   Medication Sig Dispense Refill     alendronate (FOSAMAX) 35 MG tablet Take 35 mg by mouth       anastrozole (ARIMIDEX) 1 MG tablet Take 1 mg by mouth daily        Calcium-Vitamin D-Vitamin K (CALCIUM + D + K PO) Take  by mouth. 3 pills daily       Cholecalciferol (VITAMIN D) 2000 UNITS tablet Take 2,000 Units by mouth daily        ciprofloxacin (CIPRO) 250 MG tablet TAKE ONE TABLET BY MOUTH POST COITAL 12 tablet 3     citalopram (CELEXA) 20 MG tablet Take 1 tablet (20 mg) by mouth daily 90 tablet 1     Cyanocobalamin (VITAMIN B 12 PO) Take  by mouth.       escitalopram (LEXAPRO) 20 MG tablet Take 1 tablet (20 mg) by mouth daily 90 tablet 1     ferrous sulfate (IRON) 325 (65 FE) MG tablet Take 325 mg by mouth daily (with breakfast)        ibuprofen (ADVIL,MOTRIN) 800 MG tablet Take 1 tablet (800 mg) by mouth every 8 hours as needed for pain 30 tablet 1     Iron-Vit C-Vit B12-Folic Acid (IRON 100 PLUS PO) Take  by mouth.       levothyroxine (SYNTHROID/LEVOTHROID) 75 MCG  tablet TAKE 1 TABLET BY MOUTH EVERY DAY ON AN EMPTY STOMACH 30 tablet 11     LORazepam (ATIVAN) 0.5 MG tablet Take 1 tablet (0.5 mg) by mouth nightly as needed for other (sleep) 20 tablet 0     Multiple Vitamins-Minerals (MULTIVITAMIN OR) Take  by mouth.       nystatin (MYCOSTATIN) cream Apply topically 3 times daily for 14 days 30 g 1     [DISCONTINUED] escitalopram (LEXAPRO) 20 MG tablet Take 2 tablets (40 mg) by mouth daily 180 tablet 1     Allergies   Allergen Reactions     Tylox [Oxycodone-Acetaminophen] Anaphylaxis     Hives all over, throat swells     Cats      Congestion     Codeine      Throat swells.  No reaction to SQ hydromorphone x 1 in OR 11/4/14     Recent Labs   Lab Test  06/27/18   1728  01/18/18   1558   10/27/14   0908   06/06/14   0827   05/31/13   0735   LDL  169*   --    --    --    --   157*   --   122   HDL  51   --    --    --    --   58   --   46   TRIG  81   --    --    --    --   58   --   59   ALT  21  28   --   24   < >  25   --    --    CR  0.66  0.65   < >  0.68   --   0.76   < >   --    GFRESTIMATED  >90  >90   < >  >90  Non  GFR Calc     --   81   < >   --    GFRESTBLACK  >90  >90   < >  >90   GFR Calc     --   >90   < >   --    POTASSIUM  3.8  3.7   < >  4.1   --   4.0   < >   --    TSH  1.84  2.46   < >  2.58   < >  3.77   < >   --     < > = values in this interval not displayed.      BP Readings from Last 3 Encounters:   06/27/18 130/70   01/18/18 124/76   12/05/16 138/77    Wt Readings from Last 3 Encounters:   06/27/18 221 lb (100.2 kg)   01/18/18 218 lb (98.9 kg)   10/17/16 223 lb (101.2 kg)                    Reviewed and updated as needed this visit by clinical staff  Tobacco  Allergies  Meds  Med Hx  Surg Hx  Fam Hx  Soc Hx      Reviewed and updated as needed this visit by Provider         ROS:  Constitutional, neuro, ENT, endocrine, pulmonary, cardiac, gastrointestinal, genitourinary, musculoskeletal, integument and psychiatric systems  "are negative, except as otherwise noted.    OBJECTIVE:                                                    /70  Pulse 68  Temp 97.9  F (36.6  C)  Ht 5' 6\" (1.676 m)  Wt 221 lb (100.2 kg)  SpO2 96%  BMI 35.67 kg/m2  Body mass index is 35.67 kg/(m^2).  GENERAL APPEARANCE: healthy, alert and mild distress  EYES: Eyes grossly normal to inspection, PERRL and conjunctivae and sclerae normal  HENT: ear canals and TM's normal and nose and mouth without ulcers or lesions  NECK: no adenopathy, no asymmetry, masses, or scars and thyroid normal to palpation  RESP: lungs clear to auscultation - no rales, rhonchi or wheezes  BREAST: no palpable axillary masses or adenopathy and bilateral breast implants scar clear and has great no redness.   CV: regular rates and rhythm, normal S1 S2, no S3 or S4 and no murmur, click or rub  LYMPHATICS: no cervical adenopathy  ABDOMEN: soft, nontender, without hepatosplenomegaly or masses and bowel sounds normal   (female): normal cervix, adnexae, and uterus without masses or discharge, rectal exam normal without masses-guaiac negative stool and cystocele present. Pap smear done  MS: extremities normal- no gross deformities noted  SKIN: redness in her groin to labia without odor.   NEURO: Normal strength and tone, mentation intact and speech normal  PSYCH: mentation appears normal and very stressed. Discussion.     Diagnostic test results:  Diagnostic Test Results:  Results for orders placed or performed in visit on 06/27/18 (from the past 24 hour(s))   Lipid Profile   Result Value Ref Range    Cholesterol 236 (H) <200 mg/dL    Triglycerides 81 <150 mg/dL    HDL Cholesterol 51 >49 mg/dL    LDL Cholesterol Calculated 169 (H) <100 mg/dL    Non HDL Cholesterol 185 (H) <130 mg/dL   Comprehensive metabolic panel   Result Value Ref Range    Sodium 140 133 - 144 mmol/L    Potassium 3.8 3.4 - 5.3 mmol/L    Chloride 105 94 - 109 mmol/L    Carbon Dioxide 29 20 - 32 mmol/L    Anion Gap 6 3 - 14 " mmol/L    Glucose 96 70 - 99 mg/dL    Urea Nitrogen 12 7 - 30 mg/dL    Creatinine 0.66 0.52 - 1.04 mg/dL    GFR Estimate >90 >60 mL/min/1.7m2    GFR Estimate If Black >90 >60 mL/min/1.7m2    Calcium 8.8 8.5 - 10.1 mg/dL    Bilirubin Total 0.2 0.2 - 1.3 mg/dL    Albumin 3.6 3.4 - 5.0 g/dL    Protein Total 7.3 6.8 - 8.8 g/dL    Alkaline Phosphatase 140 40 - 150 U/L    ALT 21 0 - 50 U/L    AST 18 0 - 45 U/L   CBC with platelets differential   Result Value Ref Range    WBC 6.4 4.0 - 11.0 10e9/L    RBC Count 3.97 3.8 - 5.2 10e12/L    Hemoglobin 12.3 11.7 - 15.7 g/dL    Hematocrit 35.7 35.0 - 47.0 %    MCV 90 78 - 100 fl    MCH 31.0 26.5 - 33.0 pg    MCHC 34.5 31.5 - 36.5 g/dL    RDW 12.0 10.0 - 15.0 %    Platelet Count 207 150 - 450 10e9/L    Diff Method Automated Method     % Neutrophils 55.9 %    % Lymphocytes 34.4 %    % Monocytes 7.2 %    % Eosinophils 1.9 %    % Basophils 0.3 %    % Immature Granulocytes 0.3 %    Nucleated RBCs 0 0 /100    Absolute Neutrophil 3.6 1.6 - 8.3 10e9/L    Absolute Lymphocytes 2.2 0.8 - 5.3 10e9/L    Absolute Monocytes 0.5 0.0 - 1.3 10e9/L    Absolute Eosinophils 0.1 0.0 - 0.7 10e9/L    Absolute Basophils 0.0 0.0 - 0.2 10e9/L    Abs Immature Granulocytes 0.0 0 - 0.4 10e9/L    Absolute Nucleated RBC 0.0    TSH with free T4 reflex   Result Value Ref Range    TSH 1.84 0.40 - 4.00 mU/L   UA with Microscopic reflex to Culture   Result Value Ref Range    Color Urine Light Yellow     Appearance Urine Clear     Glucose Urine Negative NEG^Negative mg/dL    Bilirubin Urine Negative NEG^Negative    Ketones Urine Negative NEG^Negative mg/dL    Specific Gravity Urine 1.006 1.003 - 1.035    Blood Urine Negative NEG^Negative    pH Urine 6.5 4.7 - 8.0 pH    Protein Albumin Urine Negative NEG^Negative mg/dL    Urobilinogen mg/dL Normal 0.0 - 2.0 mg/dL    Nitrite Urine Negative NEG^Negative    Leukocyte Esterase Urine Moderate (A) NEG^Negative    Source Midstream Urine     WBC Urine 3 0 - 5 /HPF    RBC  Urine <1 0 - 2 /HPF    Bacteria Urine None (A) NEG^Negative /HPF    Mucous Urine Present (A) NEG^Negative /LPF        ASSESSMENT/PLAN:                                                    1. Acquired hypothyroidism  Needing this done. Will then refill medications.   - TSH with free T4 reflex    2. Screening for human papillomavirus  Due for this. Hx of having breast cancer.  Had ablation. No longer gets cycle. Ovaries remain.   - A pap thin layer diagnostic with HPV (select HPV order below)    3. Encounter for Papanicolaou smear for cervical cancer screening  Done with HPV typing.  If ok good for 5 years. Same partner and low risk. No sx.   - A pap thin layer diagnostic with HPV (select HPV order below)    4. Status post bariatric surgery  Labs are due. Feeling well. Has maintained weight loss. Taking vitamins.   - UA with Microscopic reflex to Culture  - Lipid Profile  - Comprehensive metabolic panel  - CBC with platelets differential  - Vitamin B12    5. Screening for human immunodeficiency virus  due  - HIV Antigen Antibody Combo    6. Encounter for HCV screening test for low risk patient  due  - Hepatitis C Screen Reflex to HCV RNA Quant and Genotype    7. Yeast dermatitis  Given cream.   - nystatin (MYCOSTATIN) cream; Apply topically 3 times daily for 14 days  Dispense: 30 g; Refill: 1        See Patient Instructions    JOSE Madera  [facility]IBJARON

## 2018-06-27 ENCOUNTER — OFFICE VISIT (OUTPATIENT)
Dept: FAMILY MEDICINE | Facility: OTHER | Age: 53
End: 2018-06-27
Attending: PHYSICIAN ASSISTANT
Payer: COMMERCIAL

## 2018-06-27 VITALS
WEIGHT: 221 LBS | SYSTOLIC BLOOD PRESSURE: 130 MMHG | HEIGHT: 66 IN | TEMPERATURE: 97.9 F | DIASTOLIC BLOOD PRESSURE: 70 MMHG | OXYGEN SATURATION: 96 % | HEART RATE: 68 BPM | BODY MASS INDEX: 35.52 KG/M2

## 2018-06-27 DIAGNOSIS — R82.90 ABNORMAL URINE FINDINGS: ICD-10-CM

## 2018-06-27 DIAGNOSIS — Z11.51 SCREENING FOR HUMAN PAPILLOMAVIRUS: ICD-10-CM

## 2018-06-27 DIAGNOSIS — E03.9 ACQUIRED HYPOTHYROIDISM: Primary | ICD-10-CM

## 2018-06-27 DIAGNOSIS — Z11.59 ENCOUNTER FOR HCV SCREENING TEST FOR LOW RISK PATIENT: ICD-10-CM

## 2018-06-27 DIAGNOSIS — B37.2 YEAST DERMATITIS: ICD-10-CM

## 2018-06-27 DIAGNOSIS — Z12.4 ENCOUNTER FOR PAPANICOLAOU SMEAR FOR CERVICAL CANCER SCREENING: ICD-10-CM

## 2018-06-27 DIAGNOSIS — Z23 ENCOUNTER FOR IMMUNIZATION: ICD-10-CM

## 2018-06-27 DIAGNOSIS — Z98.84 STATUS POST BARIATRIC SURGERY: ICD-10-CM

## 2018-06-27 DIAGNOSIS — Z11.4 SCREENING FOR HUMAN IMMUNODEFICIENCY VIRUS: ICD-10-CM

## 2018-06-27 LAB
ALBUMIN SERPL-MCNC: 3.6 G/DL (ref 3.4–5)
ALBUMIN UR-MCNC: NEGATIVE MG/DL
ALP SERPL-CCNC: 140 U/L (ref 40–150)
ALT SERPL W P-5'-P-CCNC: 21 U/L (ref 0–50)
ANION GAP SERPL CALCULATED.3IONS-SCNC: 6 MMOL/L (ref 3–14)
APPEARANCE UR: CLEAR
AST SERPL W P-5'-P-CCNC: 18 U/L (ref 0–45)
BACTERIA #/AREA URNS HPF: ABNORMAL /HPF
BASOPHILS # BLD AUTO: 0 10E9/L (ref 0–0.2)
BASOPHILS NFR BLD AUTO: 0.3 %
BILIRUB SERPL-MCNC: 0.2 MG/DL (ref 0.2–1.3)
BILIRUB UR QL STRIP: NEGATIVE
BUN SERPL-MCNC: 12 MG/DL (ref 7–30)
CALCIUM SERPL-MCNC: 8.8 MG/DL (ref 8.5–10.1)
CHLORIDE SERPL-SCNC: 105 MMOL/L (ref 94–109)
CHOLEST SERPL-MCNC: 236 MG/DL
CO2 SERPL-SCNC: 29 MMOL/L (ref 20–32)
COLOR UR AUTO: ABNORMAL
CREAT SERPL-MCNC: 0.66 MG/DL (ref 0.52–1.04)
DIFFERENTIAL METHOD BLD: NORMAL
EOSINOPHIL # BLD AUTO: 0.1 10E9/L (ref 0–0.7)
EOSINOPHIL NFR BLD AUTO: 1.9 %
ERYTHROCYTE [DISTWIDTH] IN BLOOD BY AUTOMATED COUNT: 12 % (ref 10–15)
GFR SERPL CREATININE-BSD FRML MDRD: >90 ML/MIN/1.7M2
GLUCOSE SERPL-MCNC: 96 MG/DL (ref 70–99)
GLUCOSE UR STRIP-MCNC: NEGATIVE MG/DL
HCT VFR BLD AUTO: 35.7 % (ref 35–47)
HDLC SERPL-MCNC: 51 MG/DL
HGB BLD-MCNC: 12.3 G/DL (ref 11.7–15.7)
HGB UR QL STRIP: NEGATIVE
IMM GRANULOCYTES # BLD: 0 10E9/L (ref 0–0.4)
IMM GRANULOCYTES NFR BLD: 0.3 %
KETONES UR STRIP-MCNC: NEGATIVE MG/DL
LDLC SERPL CALC-MCNC: 169 MG/DL
LEUKOCYTE ESTERASE UR QL STRIP: ABNORMAL
LYMPHOCYTES # BLD AUTO: 2.2 10E9/L (ref 0.8–5.3)
LYMPHOCYTES NFR BLD AUTO: 34.4 %
MCH RBC QN AUTO: 31 PG (ref 26.5–33)
MCHC RBC AUTO-ENTMCNC: 34.5 G/DL (ref 31.5–36.5)
MCV RBC AUTO: 90 FL (ref 78–100)
MONOCYTES # BLD AUTO: 0.5 10E9/L (ref 0–1.3)
MONOCYTES NFR BLD AUTO: 7.2 %
MUCOUS THREADS #/AREA URNS LPF: PRESENT /LPF
NEUTROPHILS # BLD AUTO: 3.6 10E9/L (ref 1.6–8.3)
NEUTROPHILS NFR BLD AUTO: 55.9 %
NITRATE UR QL: NEGATIVE
NONHDLC SERPL-MCNC: 185 MG/DL
NRBC # BLD AUTO: 0 10*3/UL
NRBC BLD AUTO-RTO: 0 /100
PH UR STRIP: 6.5 PH (ref 4.7–8)
PLATELET # BLD AUTO: 207 10E9/L (ref 150–450)
POTASSIUM SERPL-SCNC: 3.8 MMOL/L (ref 3.4–5.3)
PROT SERPL-MCNC: 7.3 G/DL (ref 6.8–8.8)
RBC # BLD AUTO: 3.97 10E12/L (ref 3.8–5.2)
RBC #/AREA URNS AUTO: <1 /HPF (ref 0–2)
SODIUM SERPL-SCNC: 140 MMOL/L (ref 133–144)
SOURCE: ABNORMAL
SP GR UR STRIP: 1.01 (ref 1–1.03)
TRIGL SERPL-MCNC: 81 MG/DL
TSH SERPL DL<=0.005 MIU/L-ACNC: 1.84 MU/L (ref 0.4–4)
UROBILINOGEN UR STRIP-MCNC: NORMAL MG/DL (ref 0–2)
WBC # BLD AUTO: 6.4 10E9/L (ref 4–11)
WBC #/AREA URNS AUTO: 3 /HPF (ref 0–5)

## 2018-06-27 PROCEDURE — 80050 GENERAL HEALTH PANEL: CPT | Performed by: PHYSICIAN ASSISTANT

## 2018-06-27 PROCEDURE — 99000 SPECIMEN HANDLING OFFICE-LAB: CPT | Performed by: PHYSICIAN ASSISTANT

## 2018-06-27 PROCEDURE — 87086 URINE CULTURE/COLONY COUNT: CPT | Performed by: PHYSICIAN ASSISTANT

## 2018-06-27 PROCEDURE — 80061 LIPID PANEL: CPT | Performed by: PHYSICIAN ASSISTANT

## 2018-06-27 PROCEDURE — 86803 HEPATITIS C AB TEST: CPT | Mod: 90 | Performed by: PHYSICIAN ASSISTANT

## 2018-06-27 PROCEDURE — 90471 IMMUNIZATION ADMIN: CPT | Performed by: PHYSICIAN ASSISTANT

## 2018-06-27 PROCEDURE — 36415 COLL VENOUS BLD VENIPUNCTURE: CPT | Performed by: PHYSICIAN ASSISTANT

## 2018-06-27 PROCEDURE — 87389 HIV-1 AG W/HIV-1&-2 AB AG IA: CPT | Mod: 90 | Performed by: PHYSICIAN ASSISTANT

## 2018-06-27 PROCEDURE — 81001 URINALYSIS AUTO W/SCOPE: CPT | Performed by: PHYSICIAN ASSISTANT

## 2018-06-27 PROCEDURE — 88142 CYTOPATH C/V THIN LAYER: CPT | Performed by: PHYSICIAN ASSISTANT

## 2018-06-27 PROCEDURE — 82607 VITAMIN B-12: CPT | Mod: 90 | Performed by: PHYSICIAN ASSISTANT

## 2018-06-27 PROCEDURE — 87624 HPV HI-RISK TYP POOLED RSLT: CPT | Mod: 90 | Performed by: PHYSICIAN ASSISTANT

## 2018-06-27 PROCEDURE — 99214 OFFICE O/P EST MOD 30 MIN: CPT | Mod: 25 | Performed by: PHYSICIAN ASSISTANT

## 2018-06-27 PROCEDURE — 90732 PPSV23 VACC 2 YRS+ SUBQ/IM: CPT | Performed by: PHYSICIAN ASSISTANT

## 2018-06-27 RX ORDER — NYSTATIN 100000 U/G
CREAM TOPICAL 3 TIMES DAILY
Qty: 30 G | Refills: 1 | Status: SHIPPED | OUTPATIENT
Start: 2018-06-27 | End: 2018-07-11

## 2018-06-27 RX ORDER — ALENDRONATE SODIUM 35 MG/1
35 TABLET ORAL
COMMUNITY
Start: 2018-06-22 | End: 2020-03-29

## 2018-06-27 RX ORDER — LEVOTHYROXINE SODIUM 75 UG/1
TABLET ORAL
Qty: 90 TABLET | Refills: 3 | Status: SHIPPED | OUTPATIENT
Start: 2018-06-27 | End: 2019-07-02

## 2018-06-27 ASSESSMENT — ANXIETY QUESTIONNAIRES
5. BEING SO RESTLESS THAT IT IS HARD TO SIT STILL: SEVERAL DAYS
1. FEELING NERVOUS, ANXIOUS, OR ON EDGE: MORE THAN HALF THE DAYS
7. FEELING AFRAID AS IF SOMETHING AWFUL MIGHT HAPPEN: NOT AT ALL
GAD7 TOTAL SCORE: 8
2. NOT BEING ABLE TO STOP OR CONTROL WORRYING: SEVERAL DAYS
6. BECOMING EASILY ANNOYED OR IRRITABLE: SEVERAL DAYS
IF YOU CHECKED OFF ANY PROBLEMS ON THIS QUESTIONNAIRE, HOW DIFFICULT HAVE THESE PROBLEMS MADE IT FOR YOU TO DO YOUR WORK, TAKE CARE OF THINGS AT HOME, OR GET ALONG WITH OTHER PEOPLE: SOMEWHAT DIFFICULT
4. TROUBLE RELAXING: MORE THAN HALF THE DAYS
3. WORRYING TOO MUCH ABOUT DIFFERENT THINGS: SEVERAL DAYS

## 2018-06-27 ASSESSMENT — PAIN SCALES - GENERAL: PAINLEVEL: NO PAIN (0)

## 2018-06-27 NOTE — NURSING NOTE
"Chief Complaint   Patient presents with     Thyroid Problem     Depression       Initial /70  Pulse 68  Temp 97.9  F (36.6  C)  Ht 5' 6\" (1.676 m)  Wt 221 lb (100.2 kg)  SpO2 96%  BMI 35.67 kg/m2 Estimated body mass index is 35.67 kg/(m^2) as calculated from the following:    Height as of this encounter: 5' 6\" (1.676 m).    Weight as of this encounter: 221 lb (100.2 kg).  Medication Reconciliation: complete    RALPH Jose    "

## 2018-06-27 NOTE — MR AVS SNAPSHOT
After Visit Summary   6/27/2018    Izzy Shultz    MRN: [id number]           Patient Information     Date Of Birth          1965        Visit Information        Provider Department      6/27/2018 4:00 PM Rosetta Suarez PA [facility]        Today's Diagnoses     Acquired hypothyroidism    -  1    Screening for human papillomavirus        Encounter for Papanicolaou smear for cervical cancer screening        Status post bariatric surgery        Screening for human immunodeficiency virus        Encounter for HCV screening test for low risk patient          Care Instructions      Thank you for choosing [facility].   I have office hours 8:00 am to 4:30 pm on Monday's, Wednesday's, Thursday's and Friday's. My nurse and I are out of the office every Tuesday.    Following your visit, when your labs and diagnostic testing have returned, I will review then and you will be contacted by my nurse.  If you are on My Chart, you can also view results there.    For refills, notify your pharmacy regarding what you need and the pharmacy will generate a refill request. Do not call my nurse as she is unable to process refill request. Please plan ahead and allow 3-5 days for refill requests.    You will generally receive a reminder call the day prior to your appointment.  If you cannot attend your appointment, please cancel your appointment with as much notice as possible.  If there is a pattern of failure to present for your appointments, I cannot provide consistent, meaningful, ongoing care for you. It is very important to me that you come in for your care, so we can best assist you with your health care needs.    IMPORTANT:  Please note that it is my standard of practice to NOT participate in prescribing ongoing requested Narcotic Analgesic therapy, and/or participate in the prescribing of other controlled substances.  My nurse and I am happy to assist you with the process of  referral for alternative pain management as needed, and other treatment modalities including but not limited to:  Physical Therapy, Physical Medicine and Rehab, Counseling, Chiropractic Care, Orthopedic Care, and non-narcotic medication management.     In the event that you need to be seen for emergent concerns and I am out of office,  please see one of my colleagues for acute concerns.  You may also present to  or ER.  I appreciate the opportunity to serve you and look forward to supporting your healthcare needs in the future. Please contact me with any questions or concerns that you may have.    Sincerely,      Rosetta Suarez RN, PA-C               Follow-ups after your visit        Who to contact     If you have questions or need follow up information about today's clinic visit or your schedule please contact [facility] directly at [phone].  Normal or non-critical lab and imaging results will be communicated to you by VoteIthart, letter or phone within 4 business days after the clinic has received the results. If you do not hear from us within 7 days, please contact the clinic through VoteIthart or phone. If you have a critical or abnormal lab result, we will notify you by phone as soon as possible.  Submit refill requests through [employer] or call your pharmacy and they will forward the refill request to us. Please allow 3 business days for your refill to be completed.          Additional Information About Your Visit        Subwayt Information     [employer] gives you secure access to your electronic health record. If you see a primary care provider, you can also send messages to your care team and make appointments. If you have questions, please call your primary care clinic.  If you do not have a primary care provider, please call [phone] and they will assist you.        Care EveryWhere ID     This is your Care EveryWhere ID. This could be used by other organizations to access your [place]  "medical records  [id number]        Your Vitals Were     Pulse Temperature Height Pulse Oximetry BMI (Body Mass Index)       68 97.9  F (36.6  C) 5' 6\" (1.676 m) 96% 35.67 kg/m2        Blood Pressure from Last 3 Encounters:   06/27/18 130/70   01/18/18 124/76   12/05/16 138/77    Weight from Last 3 Encounters:   06/27/18 221 lb (100.2 kg)   01/18/18 218 lb (98.9 kg)   10/17/16 223 lb (101.2 kg)              We Performed the Following     A pap thin layer diagnostic with HPV (select HPV order below)     CBC with platelets differential     Comprehensive metabolic panel     Hepatitis C Screen Reflex to HCV RNA Quant and Genotype     HIV Antigen Antibody Combo     Lipid Profile     TSH with free T4 reflex     UA with Microscopic reflex to Culture     Vitamin B12        Primary Care Provider Office Phone # Fax #    JOSE Ramos [phone]       [address] [place] [id number]        Equal Access to Services     MALCOLM MOYER AH: Hadii aad ku hadasho Soomaali, waaxda luqadaha, qaybta kaalmada adeegyada, waxay salin nicole floyd . So [facility] [phone].    ATENCIÓN: Si habla español, tiene a lang disposición servicios gratuitos de asistencia lingüística. [facility] [phone].    We comply with applicable federal civil rights laws and [place] laws. We do not discriminate on the basis of race, color, national origin, age, disability, sex, sexual orientation, or gender identity.            Thank you!     Thank you for choosing [facility]  for your care. Our goal is always to provide you with excellent care. Hearing back from our patients is one way we can continue to improve our services. Please take a few minutes to complete the written survey that you may receive in the mail after your visit with us. Thank you!             Your Updated Medication List - Protect others around you: Learn how to safely use, store and throw away your medicines at " www.disposemymeds.org.          This list is accurate as of 6/27/18  5:00 PM.  Always use your most recent med list.                   Brand Name Dispense Instructions for use Diagnosis    alendronate 35 MG tablet    FOSAMAX     Take 35 mg by mouth        anastrozole 1 MG tablet    ARIMIDEX     Take 1 mg by mouth daily        CALCIUM + D + K PO      Take  by mouth. 3 pills daily        ciprofloxacin 250 MG tablet    CIPRO    12 tablet    TAKE ONE TABLET BY MOUTH POST COITAL    Urinary urgency       citalopram 20 MG tablet    celeXA    90 tablet    Take 1 tablet (20 mg) by mouth daily    Moderate episode of recurrent major depressive disorder (H)       escitalopram 20 MG tablet    LEXAPRO    90 tablet    Take 1 tablet (20 mg) by mouth daily    Moderate episode of recurrent major depressive disorder (H)       ferrous sulfate 325 (65 Fe) MG tablet    IRON     Take 325 mg by mouth daily (with breakfast)        ibuprofen 800 MG tablet    ADVIL/MOTRIN    30 tablet    Take 1 tablet (800 mg) by mouth every 8 hours as needed for pain    Osteoarthritis, unspecified osteoarthritis type, unspecified site       IRON 100 PLUS PO      Take  by mouth.        levothyroxine 75 MCG tablet    SYNTHROID/LEVOTHROID    30 tablet    TAKE 1 TABLET BY MOUTH EVERY DAY ON AN EMPTY STOMACH    Acquired hypothyroidism       LORazepam 0.5 MG tablet    ATIVAN    20 tablet    Take 1 tablet (0.5 mg) by mouth nightly as needed for other (sleep)    Primary insomnia       MULTIVITAMIN PO      Take  by mouth.        VITAMIN B 12 PO      Take  by mouth.        vitamin D 2000 units tablet      Take 2,000 Units by mouth daily

## 2018-06-27 NOTE — LETTER
My Depression Action Plan  Name: Izzy Shultz   Date of Birth 1965  Date: 6/20/2018    My doctor: Rosetta Suarez   My clinic: [facility] [place]  [place] [address]  [phone]          GREEN    ZONE   Good Control    What it looks like:     Things are going generally well. You have normal up s and down s. You may even feel depressed from time to time, but bad moods usually last less than a day.   What you need to do:  1. Continue to care for yourself (see self care plan)  2. Check your depression survival kit and update it as needed  3. Follow your physician s recommendations including any medication.  4. Do not stop taking medication unless you consult with your physician first.           YELLOW         ZONE Getting Worse    What it looks like:     Depression is starting to interfere with your life.     It may be hard to get out of bed; you may be starting to isolate yourself from others.    Symptoms of depression are starting to last most all day and this has happened for several days.     You may have suicidal thoughts but they are not constant.   What you need to do:     1. Call your care team, your response to treatment will improve if you keep your care team informed of your progress. Yellow periods are signs an adjustment may need to be made.     2. Continue your self-care, even if you have to fake it!    3. Talk to someone in your support network    4. Open up your depression survival kit           RED    ZONE Medical Alert - Get Help    What it looks like:     Depression is seriously interfering with your life.     You may experience these or other symptoms: You can t get out of bed most days, can t work or engage in other necessary activities, you have trouble taking care of basic hygiene, or basic responsibilities, thoughts of suicide or death that will not go away, self-injurious behavior.     What you need to do:  1. Call your care team and request a  same-day appointment. If they are not available (weekends or after hours) call your local crisis line, emergency room or 911.            Depression Self Care Plan / Survival Kit    Self-Care for Depression  Here s the deal. Your body and mind are really not as separate as most people think.  What you do and think affects how you feel and how you feel influences what you do and think. This means if you do things that people who feel good do, it will help you feel better.  Sometimes this is all it takes.  There is also a place for medication and therapy depending on how severe your depression is, so be sure to consult with your medical provider and/ or Behavioral Health Consultant if your symptoms are worsening or not improving.     In order to better manage my stress, I will:    Exercise  Get some form of exercise, every day. This will help reduce pain and release endorphins, the  feel good  chemicals in your brain. This is almost as good as taking antidepressants!  This is not the same as joining a gym and then never going! (they count on that by the way ) It can be as simple as just going for a walk or doing some gardening, anything that will get you moving.      Hygiene   Maintain good hygiene (Get out of bed in the morning, Make your bed, Brush your teeth, Take a shower, and Get dressed like you were going to work, even if you are unemployed).  If your clothes don't fit try to get ones that do.    Diet  I will strive to eat foods that are good for me, drink plenty of water, and avoid excessive sugar, caffeine, alcohol, and other mood-altering substances.  Some foods that are helpful in depression are: complex carbohydrates, B vitamins, flaxseed, fish or fish oil, fresh fruits and vegetables.    Psychotherapy  I agree to participate in Individual Therapy (if recommended).    Medication  If prescribed medications, I agree to take them.  Missing doses can result in serious side effects.  I understand that drinking  alcohol, or other illicit drug use, may cause potential side effects.  I will not stop my medication abruptly without first discussing it with my provider.    Staying Connected With Others  I will stay in touch with my friends, family members, and my primary care provider/team.    Use your imagination  Be creative.  We all have a creative side; it doesn t matter if it s oil painting, sand castles, or mud pies! This will also kick up the endorphins.    Witness Beauty  (AKA stop and smell the roses) Take a look outside, even in mid-winter. Notice colors, textures. Watch the squirrels and birds.     Service to others  Be of service to others.  There is always someone else in need.  By helping others we can  get out of ourselves  and remember the really important things.  This also provides opportunities for practicing all the other parts of the program.    Humor  Laugh and be silly!  Adjust your TV habits for less news and crime-drama and more comedy.    Control your stress  Try breathing deep, massage therapy, biofeedback, and meditation. Find time to relax each day.     My support system    Clinic Contact:  Phone number:    Contact 1:  Phone number:    Contact 2:  Phone number:    Baptism/:  Phone number:    Therapist:  Phone number:    Local crisis center:    Phone number:    Other community support:  Phone number:

## 2018-06-27 NOTE — PATIENT INSTRUCTIONS
Thank you for choosing [facility].   I have office hours 8:00 am to 4:30 pm on Monday's, Wednesday's, Thursday's and Friday's. My nurse and I are out of the office every Tuesday.    Following your visit, when your labs and diagnostic testing have returned, I will review then and you will be contacted by my nurse.  If you are on My Chart, you can also view results there.    For refills, notify your pharmacy regarding what you need and the pharmacy will generate a refill request. Do not call my nurse as she is unable to process refill request. Please plan ahead and allow 3-5 days for refill requests.    You will generally receive a reminder call the day prior to your appointment.  If you cannot attend your appointment, please cancel your appointment with as much notice as possible.  If there is a pattern of failure to present for your appointments, I cannot provide consistent, meaningful, ongoing care for you. It is very important to me that you come in for your care, so we can best assist you with your health care needs.    IMPORTANT:  Please note that it is my standard of practice to NOT participate in prescribing ongoing requested Narcotic Analgesic therapy, and/or participate in the prescribing of other controlled substances.  My nurse and I am happy to assist you with the process of referral for alternative pain management as needed, and other treatment modalities including but not limited to:  Physical Therapy, Physical Medicine and Rehab, Counseling, Chiropractic Care, Orthopedic Care, and non-narcotic medication management.     In the event that you need to be seen for emergent concerns and I am out of office,  please see one of my colleagues for acute concerns.  You may also present to  or ER.  I appreciate the opportunity to serve you and look forward to supporting your healthcare needs in the future. Please contact me with any questions or concerns that you may  have.    Sincerely,      Rosetta Suarez RN, PA-C

## 2018-06-28 LAB — VIT B12 SERPL-MCNC: 188 PG/ML (ref 193–986)

## 2018-06-28 ASSESSMENT — ANXIETY QUESTIONNAIRES: GAD7 TOTAL SCORE: 8

## 2018-06-28 ASSESSMENT — PATIENT HEALTH QUESTIONNAIRE - PHQ9: SUM OF ALL RESPONSES TO PHQ QUESTIONS 1-9: 14

## 2018-06-29 ENCOUNTER — TELEPHONE (OUTPATIENT)
Dept: FAMILY MEDICINE | Facility: OTHER | Age: 53
End: 2018-06-29

## 2018-06-29 DIAGNOSIS — E53.8 VITAMIN B12 DEFICIENCY (NON ANEMIC): Primary | ICD-10-CM

## 2018-06-29 LAB
BACTERIA SPEC CULT: NORMAL
HCV AB SERPL QL IA: NONREACTIVE
HIV 1+2 AB+HIV1 P24 AG SERPL QL IA: NONREACTIVE
SPECIMEN SOURCE: NORMAL

## 2018-06-29 RX ORDER — CYANOCOBALAMIN 1000 UG/ML
1500 INJECTION, SOLUTION INTRAMUSCULAR; SUBCUTANEOUS
Qty: 10 ML | Refills: 11 | Status: SHIPPED | OUTPATIENT
Start: 2018-06-29 | End: 2019-03-04

## 2018-07-03 LAB
COPATH REPORT: NORMAL
PAP: NORMAL

## 2018-07-06 LAB
FINAL DIAGNOSIS: ABNORMAL
HPV HR 12 DNA CVX QL NAA+PROBE: POSITIVE
HPV16 DNA SPEC QL NAA+PROBE: NEGATIVE
HPV18 DNA SPEC QL NAA+PROBE: NEGATIVE
SPECIMEN DESCRIPTION: ABNORMAL
SPECIMEN SOURCE CVX/VAG CYTO: ABNORMAL

## 2018-11-12 DIAGNOSIS — F33.1 MODERATE EPISODE OF RECURRENT MAJOR DEPRESSIVE DISORDER (H): ICD-10-CM

## 2018-11-13 RX ORDER — ESCITALOPRAM OXALATE 20 MG/1
TABLET ORAL
Qty: 90 TABLET | Refills: 0 | Status: SHIPPED | OUTPATIENT
Start: 2018-11-13 | End: 2019-02-27

## 2018-11-13 NOTE — TELEPHONE ENCOUNTER
ESCITALOPRAM 20 MG TABLET 20 TAB      Last Written Prescription Date:  2/7/18  Last Fill Quantity: 90,   # refills: 1  Last Office Visit: 6/27/18  Future Office visit:

## 2019-01-21 DIAGNOSIS — R39.15 URINARY URGENCY: ICD-10-CM

## 2019-01-21 DIAGNOSIS — E03.9 ACQUIRED HYPOTHYROIDISM: ICD-10-CM

## 2019-01-22 RX ORDER — CIPROFLOXACIN 250 MG/1
TABLET, FILM COATED ORAL
Qty: 12 TABLET | Refills: 3 | Status: SHIPPED | OUTPATIENT
Start: 2019-01-22 | End: 2019-03-04

## 2019-01-22 RX ORDER — LEVOTHYROXINE SODIUM 75 UG/1
TABLET ORAL
Qty: 30 TABLET | Refills: 11 | OUTPATIENT
Start: 2019-01-22

## 2019-01-22 NOTE — TELEPHONE ENCOUNTER
ciprofloxacin (CIPRO) 250 MG tablet      Last Written Prescription Date:  11/13/17  Last Fill Quantity: 12,   # refills: 3  Last Office Visit: 6/27/18  Future Office visit:       Routing refill request to provider for review/approval because:  Drug not on the [facility], [facility] or [facility] refill protocol or controlled substance

## 2019-02-27 DIAGNOSIS — F33.1 MODERATE EPISODE OF RECURRENT MAJOR DEPRESSIVE DISORDER (H): ICD-10-CM

## 2019-02-28 NOTE — TELEPHONE ENCOUNTER
Escitalopram      Last Written Prescription Date:  11/13/18  Last Fill Quantity: 90,   # refills: 0  Last Office Visit: 6/27/18  Future Office visit:    Next 5 appointments (look out 90 days)    Mar 04, 2019  3:40 PM CST  (Arrive by 3:25 PM)  SHORT with JOSE Ramos  [facility] ([facility] ) [address]  [place] [address]  [phone]

## 2019-03-01 RX ORDER — ESCITALOPRAM OXALATE 20 MG/1
TABLET ORAL
Qty: 90 TABLET | Refills: 0 | Status: SHIPPED | OUTPATIENT
Start: 2019-03-01 | End: 2019-03-04

## 2019-03-01 NOTE — PROGRESS NOTES
SUBJECTIVE:   Izzy Shultz is a 53 year old female who presents to clinic today for the following health issues:      Depression Followup    Status since last visit: Worsened Increased anxiety    See PHQ-9 for current symptoms.  Other associated symptoms: None    Complicating factors:   Significant life event:  Yes-   ill, Izzy working extra trying to support family   Current substance abuse:  None  Anxiety or Panic symptoms:  Yes-  Anxiety, constant worrying    PHQ 10/17/2016 1/18/2018 6/27/2018   PHQ-9 Total Score 9 17 14   Q9: Suicide Ideation Not at all Not at all Not at all       PHQ-9  English  PHQ-9   Any Language  Suicide Assessment Five-step Evaluation and Treatment (SAFE-T)  Hypothyroidism Follow-up      Since last visit, patient describes the following symptoms: Weight stable, no hair loss, no skin changes, no constipation, no loose stools      Amount of exercise or physical activity: 6-7 days/week for an average of 30-45 minutes    Problems taking medications regularly: No    Medication side effects: none    Diet: regular (no restrictions)            Problem list and histories reviewed & adjusted, as indicated.  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)     Osteopenia     Status post bariatric surgery     Past Surgical History:   Procedure Laterality Date     COLONOSCOPY N/A 12/5/2016    Procedure: COLONOSCOPY;  Surgeon: Ju Valentino MD;  Location: [facility]     D & C  1996     ENT SURGERY      tubes     gastric bypass  2009     HYSTEROSCOPY, ABLATE ENDOMETRIUM HYDROTHERMAL, COMBINED  7/31/2013     Procedure: COMBINED HYSTEROSCOPY, ABLATE ENDOMETRIUM HYDROTHERMAL;  HYSTEROSCOPY, ENDOMETRIAL ABLATION;  Surgeon: Vasquez Barahona MD;  Location: [facility]     knee laparoscopy      bilateral     lap band       MANDIBLE SURGERY      done to severe overbite - -in [place]     MASTECTOMY, BILATERAL Bilateral       x2       right thyroid lobectomy and isthmus  2012     TONSILLECTOMY      adenoidectomy       Social History     Tobacco Use     Smoking status: Never Smoker     Smokeless tobacco: Never Used     Tobacco comment: no passive exposure   Substance Use Topics     Alcohol use: Yes     Comment: rarely     Family History   Problem Relation Age of Onset     Cancer Father         lung     Hypertension Father      Cancer Mother         thyroid     Diabetes Mother      Other - See Comments Mother         AAA/hyperlipidemia     Hypertension Mother      Hypertension Sister      Thyroid Disease Sister      Endocrine Disease Brother         thyroid     Other - See Comments Brother         sleep apnea     Family History Negative Sister      Hypertension Brother          Current Outpatient Medications   Medication Sig Dispense Refill     anastrozole (ARIMIDEX) 1 MG tablet Take 1 mg by mouth daily        Calcium-Vitamin D-Vitamin K (CALCIUM + D + K PO) Take  by mouth. 3 pills daily       Cholecalciferol (VITAMIN D) 2000 UNITS tablet Take 2,000 Units by mouth daily        ciprofloxacin (CIPRO) 250 MG tablet TAKE ONE TABLET BY MOUTH POST COITAL 12 tablet 3     Cyanocobalamin (VITAMIN B 12 PO) Take  by mouth.       escitalopram (LEXAPRO) 20 MG tablet Take 1 tablet (20 mg) by mouth daily 90 tablet 3     ferrous sulfate (IRON) 325 (65 FE) MG tablet Take 325 mg by mouth daily (with breakfast)        ibuprofen (ADVIL,MOTRIN) 800 MG tablet Take 1 tablet (800 mg) by mouth every 8 hours as needed for pain 30 tablet 1     Iron-Vit C-Vit B12-Folic Acid (IRON 100 PLUS PO) Take  by mouth.       levothyroxine  "(SYNTHROID/LEVOTHROID) 75 MCG tablet TAKE 1 TABLET BY MOUTH EVERY DAY ON AN EMPTY STOMACH 90 tablet 3     Multiple Vitamins-Minerals (MULTIVITAMIN OR) Take  by mouth.       alendronate (FOSAMAX) 35 MG tablet Take 35 mg by mouth       Allergies   Allergen Reactions     Tylox [Oxycodone-Acetaminophen] Anaphylaxis     Hives all over, throat swells     Cats      Congestion     Codeine      Throat swells.  No reaction to SQ hydromorphone x 1 in OR 11/4/14     Recent Labs   Lab Test 06/27/18  1728 01/18/18  1558  10/27/14  0908  06/06/14  0827  05/31/13  0735   *  --   --   --   --  157*  --  122   HDL 51  --   --   --   --  58  --  46   TRIG 81  --   --   --   --  58  --  59   ALT 21 28  --  24   < > 25  --   --    CR 0.66 0.65   < > 0.68  --  0.76   < >  --    GFRESTIMATED >90 >90   < > >90  Non  GFR Calc    --  81   < >  --    GFRESTBLACK >90 >90   < > >90  African American GFR Calc    --  >90   < >  --    POTASSIUM 3.8 3.7   < > 4.1  --  4.0   < >  --    TSH 1.84 2.46   < > 2.58   < > 3.77   < >  --     < > = values in this interval not displayed.      BP Readings from Last 3 Encounters:   03/04/19 134/80   06/27/18 130/70   01/18/18 124/76    Wt Readings from Last 3 Encounters:   03/04/19 99.8 kg (220 lb)   06/27/18 100.2 kg (221 lb)   01/18/18 98.9 kg (218 lb)                    Reviewed and updated as needed this visit by clinical staff       Reviewed and updated as needed this visit by Provider         ROS:  Constitutional, neuro, ENT, endocrine, pulmonary, cardiac, gastrointestinal, genitourinary, musculoskeletal, integument and psychiatric systems are negative, except as otherwise noted.    OBJECTIVE:                                                    /80 (BP Location: Left arm, Patient Position: Sitting, Cuff Size: Adult Large)   Pulse 55   Temp 98.3  F (36.8  C) (Tympanic)   Ht 1.676 m (5' 6\")   Wt 99.8 kg (220 lb)   BMI 35.51 kg/m    Body mass index is 35.51 kg/m .  GENERAL " APPEARANCE: healthy, alert and no distress  EYES: Eyes grossly normal to inspection, PERRL and conjunctivae and sclerae normal  HENT: ear canals and TM's normal and nose and mouth without ulcers or lesions  NECK: no adenopathy, no asymmetry, masses, or scars and thyroid normal to palpation  RESP: lungs clear to auscultation - no rales, rhonchi or wheezes  CV: regular rates and rhythm, normal S1 S2, no S3 or S4 and no murmur, click or rub  LYMPHATICS: no cervical adenopathy  ABDOMEN: soft, nontender, without hepatosplenomegaly or masses and bowel sounds normal  MS: extremities normal- no gross deformities noted  SKIN: no suspicious lesions or rashes  NEURO: Normal strength and tone, mentation intact and speech normal  PSYCH: mentation appears normal and affect normal/bright    Diagnostic test results:  Diagnostic Test Results:  Results for orders placed or performed in visit on 03/04/19   TSH with free T4 reflex   Result Value Ref Range    TSH 2.39 0.40 - 4.00 mU/L   Hemoglobin A1c   Result Value Ref Range    Hemoglobin A1C 5.6 0 - 5.6 %   Comprehensive metabolic panel   Result Value Ref Range    Sodium 141 133 - 144 mmol/L    Potassium 3.6 3.4 - 5.3 mmol/L    Chloride 105 94 - 109 mmol/L    Carbon Dioxide 29 20 - 32 mmol/L    Anion Gap 7 3 - 14 mmol/L    Glucose 100 (H) 70 - 99 mg/dL    Urea Nitrogen 12 7 - 30 mg/dL    Creatinine 0.71 0.52 - 1.04 mg/dL    GFR Estimate >90 >60 mL/min/[1.73_m2]    GFR Estimate If Black >90 >60 mL/min/[1.73_m2]    Calcium 8.7 8.5 - 10.1 mg/dL    Bilirubin Total 0.2 0.2 - 1.3 mg/dL    Albumin 3.8 3.4 - 5.0 g/dL    Protein Total 7.7 6.8 - 8.8 g/dL    Alkaline Phosphatase 147 40 - 150 U/L    ALT 26 0 - 50 U/L    AST 19 0 - 45 U/L   Albumin Random Urine Quantitative with Creat Ratio   Result Value Ref Range    Creatinine Urine 133 mg/dL    Albumin Urine mg/L 23 mg/L    Albumin Urine mg/g Cr 17.37 0 - 25 mg/g Cr   CBC with platelets differential   Result Value Ref Range    WBC 7.7 4.0 -  11.0 10e9/L    RBC Count 4.15 3.8 - 5.2 10e12/L    Hemoglobin 12.6 11.7 - 15.7 g/dL    Hematocrit 37.0 35.0 - 47.0 %    MCV 89 78 - 100 fl    MCH 30.4 26.5 - 33.0 pg    MCHC 34.1 31.5 - 36.5 g/dL    RDW 11.9 10.0 - 15.0 %    Platelet Count 228 150 - 450 10e9/L    Diff Method Automated Method     % Neutrophils 53.5 %    % Lymphocytes 37.6 %    % Monocytes 6.6 %    % Eosinophils 1.6 %    % Basophils 0.4 %    % Immature Granulocytes 0.3 %    Nucleated RBCs 0 0 /100    Absolute Neutrophil 4.1 1.6 - 8.3 10e9/L    Absolute Lymphocytes 2.9 0.8 - 5.3 10e9/L    Absolute Monocytes 0.5 0.0 - 1.3 10e9/L    Absolute Eosinophils 0.1 0.0 - 0.7 10e9/L    Absolute Basophils 0.0 0.0 - 0.2 10e9/L    Abs Immature Granulocytes 0.0 0 - 0.4 10e9/L    Absolute Nucleated RBC 0.0    Estimated Average Glucose   Result Value Ref Range    Estimated Average Glucose 114 mg/dL        ASSESSMENT/PLAN:                                                    1. Moderate episode of recurrent major depressive disorder (H)  She is under a lot of pressure. Her  is not able to work and not getting disability. Has been turned down multiple times. 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 Shahzad has a day off. Her children are off to college soon and he daughter is going to [place] for school. See us back in one month. Increase her lexapro.   - escitalopram (LEXAPRO) 20 MG tablet; Take 1 tablet (20 mg) by mouth daily  Dispense: 90 tablet; Refill: 3  - TSH with free T4 reflex  - Albumin Random Urine Quantitative with Creat Ratio    2. Acquired hypothyroidism  Due for recheck.   - TSH with free T4 reflex    3. Osteopenia of lumbar spine  Discussion on use of of calcium and vit D. Had gastric bi pass recommendation for higher doses and in food sources.   - Comprehensive metabolic panel  - CBC with platelets differential    4. Low blood sugar  Checking A1C had been prediabetic has put on lots of weight due to only eating cereal.    - Hemoglobin A1c  - Comprehensive metabolic panel    5. Urinary urgency  She is given cipro. Has helped her recurrent infections to take after intercourse. Checking labs.   - ciprofloxacin (CIPRO) 250 MG tablet; TAKE ONE TABLET BY MOUTH POST COITAL  Dispense: 12 tablet; Refill: 3      Follow up with Provider - one months.      JOSE Madera  [facility]

## 2019-03-04 ENCOUNTER — OFFICE VISIT (OUTPATIENT)
Dept: FAMILY MEDICINE | Facility: OTHER | Age: 54
End: 2019-03-04
Attending: PHYSICIAN ASSISTANT
Payer: COMMERCIAL

## 2019-03-04 VITALS
SYSTOLIC BLOOD PRESSURE: 134 MMHG | BODY MASS INDEX: 35.36 KG/M2 | WEIGHT: 220 LBS | DIASTOLIC BLOOD PRESSURE: 80 MMHG | HEART RATE: 55 BPM | TEMPERATURE: 98.3 F | HEIGHT: 66 IN

## 2019-03-04 DIAGNOSIS — F33.1 MODERATE EPISODE OF RECURRENT MAJOR DEPRESSIVE DISORDER (H): ICD-10-CM

## 2019-03-04 DIAGNOSIS — M85.88 OSTEOPENIA OF LUMBAR SPINE: ICD-10-CM

## 2019-03-04 DIAGNOSIS — R39.15 URINARY URGENCY: ICD-10-CM

## 2019-03-04 DIAGNOSIS — F41.9 ANXIETY: ICD-10-CM

## 2019-03-04 DIAGNOSIS — E16.2 LOW BLOOD SUGAR: ICD-10-CM

## 2019-03-04 DIAGNOSIS — E03.9 ACQUIRED HYPOTHYROIDISM: Primary | ICD-10-CM

## 2019-03-04 LAB
ALBUMIN SERPL-MCNC: 3.8 G/DL (ref 3.4–5)
ALP SERPL-CCNC: 147 U/L (ref 40–150)
ALT SERPL W P-5'-P-CCNC: 26 U/L (ref 0–50)
ANION GAP SERPL CALCULATED.3IONS-SCNC: 7 MMOL/L (ref 3–14)
AST SERPL W P-5'-P-CCNC: 19 U/L (ref 0–45)
BASOPHILS # BLD AUTO: 0 10E9/L (ref 0–0.2)
BASOPHILS NFR BLD AUTO: 0.4 %
BILIRUB SERPL-MCNC: 0.2 MG/DL (ref 0.2–1.3)
BUN SERPL-MCNC: 12 MG/DL (ref 7–30)
CALCIUM SERPL-MCNC: 8.7 MG/DL (ref 8.5–10.1)
CHLORIDE SERPL-SCNC: 105 MMOL/L (ref 94–109)
CO2 SERPL-SCNC: 29 MMOL/L (ref 20–32)
CREAT SERPL-MCNC: 0.71 MG/DL (ref 0.52–1.04)
CREAT UR-MCNC: 133 MG/DL
DIFFERENTIAL METHOD BLD: NORMAL
EOSINOPHIL # BLD AUTO: 0.1 10E9/L (ref 0–0.7)
EOSINOPHIL NFR BLD AUTO: 1.6 %
ERYTHROCYTE [DISTWIDTH] IN BLOOD BY AUTOMATED COUNT: 11.9 % (ref 10–15)
EST. AVERAGE GLUCOSE BLD GHB EST-MCNC: 114 MG/DL
GFR SERPL CREATININE-BSD FRML MDRD: >90 ML/MIN/{1.73_M2}
GLUCOSE SERPL-MCNC: 100 MG/DL (ref 70–99)
HBA1C MFR BLD: 5.6 % (ref 0–5.6)
HCT VFR BLD AUTO: 37 % (ref 35–47)
HGB BLD-MCNC: 12.6 G/DL (ref 11.7–15.7)
IMM GRANULOCYTES # BLD: 0 10E9/L (ref 0–0.4)
IMM GRANULOCYTES NFR BLD: 0.3 %
LYMPHOCYTES # BLD AUTO: 2.9 10E9/L (ref 0.8–5.3)
LYMPHOCYTES NFR BLD AUTO: 37.6 %
MCH RBC QN AUTO: 30.4 PG (ref 26.5–33)
MCHC RBC AUTO-ENTMCNC: 34.1 G/DL (ref 31.5–36.5)
MCV RBC AUTO: 89 FL (ref 78–100)
MICROALBUMIN UR-MCNC: 23 MG/L
MICROALBUMIN/CREAT UR: 17.37 MG/G CR (ref 0–25)
MONOCYTES # BLD AUTO: 0.5 10E9/L (ref 0–1.3)
MONOCYTES NFR BLD AUTO: 6.6 %
NEUTROPHILS # BLD AUTO: 4.1 10E9/L (ref 1.6–8.3)
NEUTROPHILS NFR BLD AUTO: 53.5 %
NRBC # BLD AUTO: 0 10*3/UL
NRBC BLD AUTO-RTO: 0 /100
PLATELET # BLD AUTO: 228 10E9/L (ref 150–450)
POTASSIUM SERPL-SCNC: 3.6 MMOL/L (ref 3.4–5.3)
PROT SERPL-MCNC: 7.7 G/DL (ref 6.8–8.8)
RBC # BLD AUTO: 4.15 10E12/L (ref 3.8–5.2)
SODIUM SERPL-SCNC: 141 MMOL/L (ref 133–144)
TSH SERPL DL<=0.005 MIU/L-ACNC: 2.39 MU/L (ref 0.4–4)
WBC # BLD AUTO: 7.7 10E9/L (ref 4–11)

## 2019-03-04 PROCEDURE — 99214 OFFICE O/P EST MOD 30 MIN: CPT | Performed by: PHYSICIAN ASSISTANT

## 2019-03-04 PROCEDURE — 80050 GENERAL HEALTH PANEL: CPT | Mod: ZL | Performed by: PHYSICIAN ASSISTANT

## 2019-03-04 PROCEDURE — G0463 HOSPITAL OUTPT CLINIC VISIT: HCPCS | Performed by: PHYSICIAN ASSISTANT

## 2019-03-04 PROCEDURE — 83036 HEMOGLOBIN GLYCOSYLATED A1C: CPT | Mod: ZL | Performed by: PHYSICIAN ASSISTANT

## 2019-03-04 PROCEDURE — 36415 COLL VENOUS BLD VENIPUNCTURE: CPT | Mod: ZL | Performed by: PHYSICIAN ASSISTANT

## 2019-03-04 PROCEDURE — 82043 UR ALBUMIN QUANTITATIVE: CPT | Mod: ZL | Performed by: PHYSICIAN ASSISTANT

## 2019-03-04 RX ORDER — ESCITALOPRAM OXALATE 20 MG/1
20 TABLET ORAL DAILY
Qty: 90 TABLET | Refills: 3 | Status: SHIPPED | OUTPATIENT
Start: 2019-03-04 | End: 2020-03-29

## 2019-03-04 RX ORDER — HYDROXYZINE HYDROCHLORIDE 25 MG/1
25 TABLET, FILM COATED ORAL 3 TIMES DAILY PRN
Qty: 90 TABLET | Refills: 3 | Status: SHIPPED | OUTPATIENT
Start: 2019-03-04 | End: 2020-03-29

## 2019-03-04 RX ORDER — CIPROFLOXACIN 250 MG/1
TABLET, FILM COATED ORAL
Qty: 12 TABLET | Refills: 3 | Status: SHIPPED | OUTPATIENT
Start: 2019-03-04 | End: 2020-03-29

## 2019-03-04 ASSESSMENT — ANXIETY QUESTIONNAIRES
5. BEING SO RESTLESS THAT IT IS HARD TO SIT STILL: SEVERAL DAYS
IF YOU CHECKED OFF ANY PROBLEMS ON THIS QUESTIONNAIRE, HOW DIFFICULT HAVE THESE PROBLEMS MADE IT FOR YOU TO DO YOUR WORK, TAKE CARE OF THINGS AT HOME, OR GET ALONG WITH OTHER PEOPLE: SOMEWHAT DIFFICULT
6. BECOMING EASILY ANNOYED OR IRRITABLE: MORE THAN HALF THE DAYS
7. FEELING AFRAID AS IF SOMETHING AWFUL MIGHT HAPPEN: MORE THAN HALF THE DAYS
1. FEELING NERVOUS, ANXIOUS, OR ON EDGE: NEARLY EVERY DAY
GAD7 TOTAL SCORE: 16
3. WORRYING TOO MUCH ABOUT DIFFERENT THINGS: NEARLY EVERY DAY
2. NOT BEING ABLE TO STOP OR CONTROL WORRYING: NEARLY EVERY DAY

## 2019-03-04 ASSESSMENT — PAIN SCALES - GENERAL: PAINLEVEL: NO PAIN (0)

## 2019-03-04 ASSESSMENT — PATIENT HEALTH QUESTIONNAIRE - PHQ9
SUM OF ALL RESPONSES TO PHQ QUESTIONS 1-9: 17
5. POOR APPETITE OR OVEREATING: MORE THAN HALF THE DAYS

## 2019-03-04 ASSESSMENT — MIFFLIN-ST. JEOR: SCORE: 1619.66

## 2019-03-04 NOTE — NURSING NOTE
"Chief Complaint   Patient presents with     Depression     Anxiety       Initial /80 (BP Location: Left arm, Patient Position: Sitting, Cuff Size: Adult Large)   Pulse 55   Temp 98.3  F (36.8  C) (Tympanic)   Ht 1.676 m (5' 6\")   Wt 99.8 kg (220 lb)   BMI 35.51 kg/m   Estimated body mass index is 35.51 kg/m  as calculated from the following:    Height as of this encounter: 1.676 m (5' 6\").    Weight as of this encounter: 99.8 kg (220 lb).  Medication Reconciliation: complete    Sonam Zapata LPN  "

## 2019-03-04 NOTE — PATIENT INSTRUCTIONS
Thank you for choosing [facility].   I have office hours 8:00 am to 4:30 pm on Monday's, Wednesday's, Thursday's and Friday's. My nurse and I are out of the office every Tuesday.    Following your visit, when your labs and diagnostic testing have returned, I will review then and you will be contacted by my nurse.  If you are on My Chart, you can also view results there.    For refills, notify your pharmacy regarding what you need and the pharmacy will generate a refill request. Do not call my nurse as she is unable to process refill request. Please plan ahead and allow 3-5 days for refill requests.    You will generally receive a reminder call the day prior to your appointment.  If you cannot attend your appointment, please cancel your appointment with as much notice as possible.  If there is a pattern of failure to present for your appointments, I cannot provide consistent, meaningful, ongoing care for you. It is very important to me that you come in for your care, so we can best assist you with your health care needs.    IMPORTANT:  Please note that it is my standard of practice to NOT participate in prescribing ongoing requested Narcotic Analgesic therapy, and/or participate in the prescribing of other controlled substances.  My nurse and I am happy to assist you with the process of referral for alternative pain management as needed, and other treatment modalities including but not limited to:  Physical Therapy, Physical Medicine and Rehab, Counseling, Chiropractic Care, Orthopedic Care, and non-narcotic medication management.     In the event that you need to be seen for emergent concerns and I am out of office,  please see one of my colleagues for acute concerns.  You may also present to  or ER.  I appreciate the opportunity to serve you and look forward to supporting your healthcare needs in the future. Please contact me with any questions or concerns that you may  have.    Sincerely,      Rosetta Suarez RN, PA-C

## 2019-03-05 ASSESSMENT — ANXIETY QUESTIONNAIRES: GAD7 TOTAL SCORE: 16

## 2019-05-13 ENCOUNTER — TELEPHONE (OUTPATIENT)
Dept: FAMILY MEDICINE | Facility: OTHER | Age: 54
End: 2019-05-13

## 2019-05-13 NOTE — TELEPHONE ENCOUNTER
8:30 AM    Reason for Call: OVERBOOK    Patient is having the following symptoms: lower back pain//has had cancer// can't lay on stomach for 1 weeks.    The patient is requesting an appointment for Wednesday 05/15/19 with provider.    Was an appointment offered for this call? No  If yes : Appointment type              Date    Preferred method for responding to this message: Telephone Call  What is your phone number ?[phone]    If we cannot reach you directly, may we leave a detailed response at the number you provided? Yes    Can this message wait until your PCP/provider returns, if unavailable today? Not applicable, provider is in    [place]

## 2019-05-15 ENCOUNTER — OFFICE VISIT (OUTPATIENT)
Dept: CHIROPRACTIC MEDICINE | Facility: OTHER | Age: 54
End: 2019-05-15
Attending: CHIROPRACTOR
Payer: COMMERCIAL

## 2019-05-15 DIAGNOSIS — M54.50 ACUTE BILATERAL LOW BACK PAIN WITHOUT SCIATICA: ICD-10-CM

## 2019-05-15 DIAGNOSIS — M99.01 SEGMENTAL AND SOMATIC DYSFUNCTION OF CERVICAL REGION: ICD-10-CM

## 2019-05-15 DIAGNOSIS — M99.02 SEGMENTAL AND SOMATIC DYSFUNCTION OF THORACIC REGION: ICD-10-CM

## 2019-05-15 DIAGNOSIS — M99.03 SEGMENTAL AND SOMATIC DYSFUNCTION OF LUMBAR REGION: Primary | ICD-10-CM

## 2019-05-15 PROCEDURE — 98941 CHIROPRACT MANJ 3-4 REGIONS: CPT | Mod: AT | Performed by: CHIROPRACTOR

## 2019-05-15 PROCEDURE — 99202 OFFICE O/P NEW SF 15 MIN: CPT | Mod: 25 | Performed by: CHIROPRACTOR

## 2019-05-16 NOTE — PROGRESS NOTES
Subjective Finding:    Chief compalint: Patient presents with:  Back Pain  Neck Pain  , Pain Scale: 7/10, Intensity: sharp, Duration: 2 months, Radiating: bilateral buttock.    Date of injury:     Activities that the pain restricts:   Home/household/hobbies/social activities: yes.  Work duties: yes.  Sleep: yes.  Makes symptoms better: rest.  Makes symptoms worse: activity and lumbar flexion.  Have you seen anyone else for the symptoms? No.  Work related: no.  Automobile related injury: no.    Objective and Assessment:    Posture Analysis:   High shoulder: .  Head tilt: .  High iliac crest: .  Head carriage: neutral.  Thoracic Kyphosis: neutral.  Lumbar Lordosis: forward.    Lumbar Range of Motion: extension decreased.  Cervical Range of Motion: extension decreased.  Thoracic Range of Motion: extension decreased.  Extremity Range of Motion: .    Palpation:   Quad lumb: bilateral, referred pain: no  Traps: sharp pain, referred pain: no    Segmental dysfunction pre-treatment and treatment area: C3, T4, L4 and L5.    Assessment post-treatment:  Cervical: ROM increased.  Thoracic: ROM increased.  Lumbar: ROM increased.    Comments: .      Complicating Factors: .    Procedure(s):  [facility]:  [id number] Chiropractic manipulative treatment 3-4 regions performed   Cervical: Diversified, See above for level, Supine, Thoracic: Diversified, See above for level, Prone and Lumbar: Diversified, See above for level, Side posture    Modalities:  None performed this visit    Therapeutic procedures:  None    Plan:  Treatment plan: PRN.  Instructed patient: stretch as instructed at visit.  Short term goals: reduce pain.  Long term goals: restore normal function.  Prognosis: excellent.

## 2019-05-20 ENCOUNTER — OFFICE VISIT (OUTPATIENT)
Dept: CHIROPRACTIC MEDICINE | Facility: OTHER | Age: 54
End: 2019-05-20
Attending: CHIROPRACTOR
Payer: COMMERCIAL

## 2019-05-20 DIAGNOSIS — M99.03 SEGMENTAL AND SOMATIC DYSFUNCTION OF LUMBAR REGION: Primary | ICD-10-CM

## 2019-05-20 DIAGNOSIS — M54.50 ACUTE BILATERAL LOW BACK PAIN WITHOUT SCIATICA: ICD-10-CM

## 2019-05-20 DIAGNOSIS — M99.01 SEGMENTAL AND SOMATIC DYSFUNCTION OF CERVICAL REGION: ICD-10-CM

## 2019-05-20 DIAGNOSIS — M99.02 SEGMENTAL AND SOMATIC DYSFUNCTION OF THORACIC REGION: ICD-10-CM

## 2019-05-20 PROCEDURE — 98941 CHIROPRACT MANJ 3-4 REGIONS: CPT | Mod: AT | Performed by: CHIROPRACTOR

## 2019-05-22 NOTE — PROGRESS NOTES
Subjective Finding:    Chief compalint: Patient presents with:  Neck Pain: doing better with last treatment  Back Pain  , Pain Scale: 7/10, Intensity: sharp, Duration: 2 months, Radiating: bilateral buttock.    Date of injury:     Activities that the pain restricts:   Home/household/hobbies/social activities: yes.  Work duties: yes.  Sleep: yes.  Makes symptoms better: rest.  Makes symptoms worse: activity and lumbar flexion.  Have you seen anyone else for the symptoms? No.  Work related: no.  Automobile related injury: no.    Objective and Assessment:    Posture Analysis:   High shoulder: .  Head tilt: .  High iliac crest: .  Head carriage: neutral.  Thoracic Kyphosis: neutral.  Lumbar Lordosis: forward.    Lumbar Range of Motion: extension decreased.  Cervical Range of Motion: extension decreased.  Thoracic Range of Motion: extension decreased.  Extremity Range of Motion: .    Palpation:   Quad lumb: bilateral, referred pain: no  Traps: sharp pain, referred pain: no    Segmental dysfunction pre-treatment and treatment area: C3, T4, L4 and L5.    Assessment post-treatment:  Cervical: ROM increased.  Thoracic: ROM increased.  Lumbar: ROM increased.    Comments: .      Complicating Factors: .    Procedure(s):  CMT:  56094 Chiropractic manipulative treatment 3-4 regions performed   Cervical: Diversified, See above for level, Supine, Thoracic: Diversified, See above for level, Prone and Lumbar: Diversified, See above for level, Side posture    Modalities:  None performed this visit    Therapeutic procedures:  None    Plan:  Treatment plan: PRN.  Instructed patient: stretch as instructed at visit.  Short term goals: reduce pain.  Long term goals: restore normal function.  Prognosis: excellent.

## 2019-07-02 DIAGNOSIS — E03.9 ACQUIRED HYPOTHYROIDISM: ICD-10-CM

## 2019-07-03 RX ORDER — LEVOTHYROXINE SODIUM 75 UG/1
TABLET ORAL
Qty: 90 TABLET | Refills: 0 | Status: SHIPPED | OUTPATIENT
Start: 2019-07-03 | End: 2019-10-16

## 2019-09-18 ENCOUNTER — APPOINTMENT (OUTPATIENT)
Dept: OCCUPATIONAL MEDICINE | Facility: OTHER | Age: 54
End: 2019-09-18

## 2019-09-18 PROCEDURE — 99000 SPECIMEN HANDLING OFFICE-LAB: CPT

## 2019-10-16 DIAGNOSIS — E03.9 ACQUIRED HYPOTHYROIDISM: ICD-10-CM

## 2019-10-18 RX ORDER — LEVOTHYROXINE SODIUM 75 UG/1
TABLET ORAL
Qty: 90 TABLET | Refills: 1 | Status: SHIPPED | OUTPATIENT
Start: 2019-10-18 | End: 2020-09-03

## 2020-03-02 ENCOUNTER — HEALTH MAINTENANCE LETTER (OUTPATIENT)
Age: 55
End: 2020-03-02

## 2020-03-29 ENCOUNTER — APPOINTMENT (OUTPATIENT)
Dept: CT IMAGING | Facility: HOSPITAL | Age: 55
End: 2020-03-29
Attending: EMERGENCY MEDICINE
Payer: OTHER MISCELLANEOUS

## 2020-03-29 ENCOUNTER — TRANSFERRED RECORDS (OUTPATIENT)
Dept: HEALTH INFORMATION MANAGEMENT | Facility: CLINIC | Age: 55
End: 2020-03-29

## 2020-03-29 ENCOUNTER — HOSPITAL ENCOUNTER (EMERGENCY)
Facility: HOSPITAL | Age: 55
Discharge: ANOTHER HOSPITAL | End: 2020-03-29
Attending: EMERGENCY MEDICINE | Admitting: EMERGENCY MEDICINE
Payer: OTHER MISCELLANEOUS

## 2020-03-29 VITALS
RESPIRATION RATE: 18 BRPM | TEMPERATURE: 97.8 F | HEART RATE: 72 BPM | DIASTOLIC BLOOD PRESSURE: 79 MMHG | SYSTOLIC BLOOD PRESSURE: 143 MMHG | OXYGEN SATURATION: 98 %

## 2020-03-29 DIAGNOSIS — S22.080A COMPRESSION FRACTURE OF T12 VERTEBRA, INITIAL ENCOUNTER (H): ICD-10-CM

## 2020-03-29 LAB
ALBUMIN SERPL-MCNC: 3.4 G/DL (ref 3.4–5)
ALP SERPL-CCNC: 159 U/L (ref 40–150)
ALT SERPL W P-5'-P-CCNC: 18 U/L (ref 0–50)
ANION GAP SERPL CALCULATED.3IONS-SCNC: 6 MMOL/L (ref 3–14)
AST SERPL W P-5'-P-CCNC: 23 U/L (ref 0–45)
BASOPHILS # BLD AUTO: 0 10E9/L (ref 0–0.2)
BASOPHILS NFR BLD AUTO: 0.2 %
BILIRUB SERPL-MCNC: 0.3 MG/DL (ref 0.2–1.3)
BUN SERPL-MCNC: 8 MG/DL (ref 7–30)
CALCIUM SERPL-MCNC: 8.5 MG/DL (ref 8.5–10.1)
CHLORIDE SERPL-SCNC: 105 MMOL/L (ref 94–109)
CO2 SERPL-SCNC: 26 MMOL/L (ref 20–32)
CREAT SERPL-MCNC: 0.61 MG/DL (ref 0.52–1.04)
DIFFERENTIAL METHOD BLD: NORMAL
EOSINOPHIL # BLD AUTO: 0 10E9/L (ref 0–0.7)
EOSINOPHIL NFR BLD AUTO: 0.1 %
ERYTHROCYTE [DISTWIDTH] IN BLOOD BY AUTOMATED COUNT: 11.9 % (ref 10–15)
GFR SERPL CREATININE-BSD FRML MDRD: >90 ML/MIN/{1.73_M2}
GLUCOSE SERPL-MCNC: 119 MG/DL (ref 70–99)
HCT VFR BLD AUTO: 36.5 % (ref 35–47)
HGB BLD-MCNC: 12.2 G/DL (ref 11.7–15.7)
IMM GRANULOCYTES # BLD: 0 10E9/L (ref 0–0.4)
IMM GRANULOCYTES NFR BLD: 0.4 %
LACTATE BLD-SCNC: 1.2 MMOL/L (ref 0.7–2)
LYMPHOCYTES # BLD AUTO: 1.1 10E9/L (ref 0.8–5.3)
LYMPHOCYTES NFR BLD AUTO: 11.4 %
MCH RBC QN AUTO: 30.3 PG (ref 26.5–33)
MCHC RBC AUTO-ENTMCNC: 33.4 G/DL (ref 31.5–36.5)
MCV RBC AUTO: 91 FL (ref 78–100)
MONOCYTES # BLD AUTO: 0.4 10E9/L (ref 0–1.3)
MONOCYTES NFR BLD AUTO: 4.3 %
NEUTROPHILS # BLD AUTO: 7.7 10E9/L (ref 1.6–8.3)
NEUTROPHILS NFR BLD AUTO: 83.6 %
NRBC # BLD AUTO: 0 10*3/UL
NRBC BLD AUTO-RTO: 0 /100
PLATELET # BLD AUTO: 196 10E9/L (ref 150–450)
POTASSIUM SERPL-SCNC: 4.1 MMOL/L (ref 3.4–5.3)
PROT SERPL-MCNC: 7.2 G/DL (ref 6.8–8.8)
RBC # BLD AUTO: 4.03 10E12/L (ref 3.8–5.2)
SODIUM SERPL-SCNC: 137 MMOL/L (ref 133–144)
WBC # BLD AUTO: 9.2 10E9/L (ref 4–11)

## 2020-03-29 PROCEDURE — 36415 COLL VENOUS BLD VENIPUNCTURE: CPT | Performed by: EMERGENCY MEDICINE

## 2020-03-29 PROCEDURE — 99283 EMERGENCY DEPT VISIT LOW MDM: CPT | Mod: Z6 | Performed by: EMERGENCY MEDICINE

## 2020-03-29 PROCEDURE — 72131 CT LUMBAR SPINE W/O DYE: CPT | Mod: TC

## 2020-03-29 PROCEDURE — 96374 THER/PROPH/DIAG INJ IV PUSH: CPT

## 2020-03-29 PROCEDURE — 80053 COMPREHEN METABOLIC PANEL: CPT | Performed by: EMERGENCY MEDICINE

## 2020-03-29 PROCEDURE — 85025 COMPLETE CBC W/AUTO DIFF WBC: CPT | Performed by: EMERGENCY MEDICINE

## 2020-03-29 PROCEDURE — 72128 CT CHEST SPINE W/O DYE: CPT | Mod: TC

## 2020-03-29 PROCEDURE — 74150 CT ABDOMEN W/O CONTRAST: CPT | Mod: TC

## 2020-03-29 PROCEDURE — 83605 ASSAY OF LACTIC ACID: CPT | Performed by: EMERGENCY MEDICINE

## 2020-03-29 PROCEDURE — 25000128 H RX IP 250 OP 636: Performed by: EMERGENCY MEDICINE

## 2020-03-29 PROCEDURE — 96372 THER/PROPH/DIAG INJ SC/IM: CPT | Mod: XS

## 2020-03-29 PROCEDURE — 99285 EMERGENCY DEPT VISIT HI MDM: CPT | Mod: 25

## 2020-03-29 PROCEDURE — 72192 CT PELVIS W/O DYE: CPT | Mod: TC

## 2020-03-29 RX ORDER — FENTANYL CITRATE 50 UG/ML
50 INJECTION, SOLUTION INTRAMUSCULAR; INTRAVENOUS ONCE
Status: COMPLETED | OUTPATIENT
Start: 2020-03-29 | End: 2020-03-29

## 2020-03-29 RX ADMIN — FENTANYL CITRATE 50 MCG: 50 INJECTION, SOLUTION INTRAMUSCULAR; INTRAVENOUS at 05:17

## 2020-03-29 RX ADMIN — FENTANYL CITRATE 50 MCG: 50 INJECTION, SOLUTION INTRAMUSCULAR; INTRAVENOUS at 07:15

## 2020-03-29 ASSESSMENT — ENCOUNTER SYMPTOMS
PSYCHIATRIC NEGATIVE: 1
NECK STIFFNESS: 0
BACK PAIN: 1
ENDOCRINE NEGATIVE: 1
ALLERGIC/IMMUNOLOGIC NEGATIVE: 1
RESPIRATORY NEGATIVE: 1
NEUROLOGICAL NEGATIVE: 1
CONSTITUTIONAL NEGATIVE: 1
CARDIOVASCULAR NEGATIVE: 1
NECK PAIN: 0
GASTROINTESTINAL NEGATIVE: 1
HEMATOLOGIC/LYMPHATIC NEGATIVE: 1
MYALGIAS: 0
EYES NEGATIVE: 1
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
## 2020-03-29 NOTE — ED NOTES
Face to face report given with opportunity to observe patient.    Report given to ewa Bhardwaj RN   3/29/2020  7:08 AM

## 2020-04-01 ENCOUNTER — TELEPHONE (OUTPATIENT)
Dept: FAMILY MEDICINE | Facility: OTHER | Age: 55
End: 2020-04-01

## 2020-04-09 ENCOUNTER — TELEPHONE (OUTPATIENT)
Dept: FAMILY MEDICINE | Facility: OTHER | Age: 55
End: 2020-04-09

## 2020-04-09 ENCOUNTER — OFFICE VISIT (OUTPATIENT)
Dept: FAMILY MEDICINE | Facility: OTHER | Age: 55
End: 2020-04-09
Attending: PHYSICIAN ASSISTANT
Payer: OTHER MISCELLANEOUS

## 2020-04-09 VITALS
SYSTOLIC BLOOD PRESSURE: 136 MMHG | DIASTOLIC BLOOD PRESSURE: 74 MMHG | HEART RATE: 64 BPM | OXYGEN SATURATION: 99 % | RESPIRATION RATE: 16 BRPM

## 2020-04-09 DIAGNOSIS — M62.830 BACK MUSCLE SPASM: Primary | ICD-10-CM

## 2020-04-09 DIAGNOSIS — S22.081D CLOSED STABLE BURST FRACTURE OF TWELFTH THORACIC VERTEBRA WITH ROUTINE HEALING, SUBSEQUENT ENCOUNTER: Primary | ICD-10-CM

## 2020-04-09 PROCEDURE — 99202 OFFICE O/P NEW SF 15 MIN: CPT

## 2020-04-09 RX ORDER — ESCITALOPRAM OXALATE 20 MG/1
TABLET ORAL
COMMUNITY
Start: 2020-01-16 | End: 2020-06-22

## 2020-04-09 RX ORDER — CYCLOBENZAPRINE HCL 10 MG
10 TABLET ORAL 3 TIMES DAILY PRN
Qty: 90 TABLET | Refills: 1 | Status: SHIPPED | OUTPATIENT
Start: 2020-04-09 | End: 2020-06-22

## 2020-04-09 RX ORDER — SENNOSIDES 8.6 MG
CAPSULE ORAL
COMMUNITY
Start: 2020-03-30

## 2020-04-09 RX ORDER — ANASTROZOLE 1 MG/1
1 TABLET ORAL DAILY
Refills: 1 | COMMUNITY
Start: 2019-08-12 | End: 2020-05-27

## 2020-04-09 RX ORDER — AMOXICILLIN 250 MG
2 CAPSULE ORAL
COMMUNITY
Start: 2020-03-30 | End: 2020-05-27

## 2020-04-09 RX ORDER — BISACODYL 10 MG
10 SUPPOSITORY, RECTAL RECTAL DAILY PRN
Qty: 12 SUPPOSITORY | Refills: 0 | Status: SHIPPED | OUTPATIENT
Start: 2020-04-09 | End: 2020-05-27

## 2020-04-09 NOTE — PATIENT INSTRUCTIONS
Thank you for choosing [facility].   I have office hours 8:00 am to 4:30 pm on Monday's, Wednesday's, Thursday's and Friday's. My nurse and I are out of the office every Tuesday.    Following your visit, when your labs and diagnostic testing have returned, I will review then and you will be contacted by my nurse.  If you are on My Chart, you can also view results there.    For refills, notify your pharmacy regarding what you need and the pharmacy will generate a refill request. Do not call my nurse as she is unable to process refill request. Please plan ahead and allow 3-5 days for refill requests.    You will generally receive a reminder call the day prior to your appointment.  If you cannot attend your appointment, please cancel your appointment with as much notice as possible.  If there is a pattern of failure to present for your appointments, I cannot provide consistent, meaningful, ongoing care for you. It is very important to me that you come in for your care, so we can best assist you with your health care needs.    IMPORTANT:  Please note that it is my standard of practice to NOT participate in prescribing ongoing requested Narcotic Analgesic therapy, and/or participate in the prescribing of other controlled substances.  My nurse and I am happy to assist you with the process of referral for alternative pain management as needed, and other treatment modalities including but not limited to:  Physical Therapy, Physical Medicine and Rehab, Counseling, Chiropractic Care, Orthopedic Care, and non-narcotic medication management.     In the event that you need to be seen for emergent concerns and I am out of office,  please see one of my colleagues for acute concerns.  You may also present to  or ER.  I appreciate the opportunity to serve you and look forward to supporting your healthcare needs in the future. Please contact me with any questions or concerns that you may  have.    Sincerely,      Rosetta Suarez RN, PA-C

## 2020-04-09 NOTE — TELEPHONE ENCOUNTER
Telephone call from patient relating to appointment with Rosetta Suarez today. Patient reporting muscle relaxors were discussed during the appointment and patient feels they may be helpful for her. Wondering if Rosetta Suarez could prescribe a muscle relaxor.     Please advise. Patient contact information review and is current.

## 2020-04-09 NOTE — TELEPHONE ENCOUNTER
Telephone call. Patient notified muscle relaxer has been called to [facility] in [place] per JOSE Salguero order.

## 2020-04-09 NOTE — NURSING NOTE
"Chief Complaint   Patient presents with     Work [facility] F/U       Initial /74   Pulse 64   Resp 16   SpO2 99%  Estimated body mass index is 35.51 kg/m  as calculated from the following:    Height as of 3/4/19: 1.676 m (5' 6\").    Weight as of 3/4/19: 99.8 kg (220 lb).  Medication Reconciliation: complete  Mer Aaron LPN  "
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
## 2020-04-14 ENCOUNTER — TELEPHONE (OUTPATIENT)
Dept: FAMILY MEDICINE | Facility: OTHER | Age: 55
End: 2020-04-14
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
## 2020-04-15 ENCOUNTER — VIRTUAL VISIT (OUTPATIENT)
Dept: FAMILY MEDICINE | Facility: OTHER | Age: 55
End: 2020-04-15
Attending: PHYSICIAN ASSISTANT
Payer: OTHER MISCELLANEOUS

## 2020-04-15 DIAGNOSIS — M23.91 KNEE LOCKING, RIGHT: ICD-10-CM

## 2020-04-15 DIAGNOSIS — M54.9 RADIATING BACK PAIN: Primary | ICD-10-CM

## 2020-04-15 PROCEDURE — 99213 OFFICE O/P EST LOW 20 MIN: CPT | Mod: 95 | Performed by: PHYSICIAN ASSISTANT

## 2020-04-15 RX ORDER — GABAPENTIN 300 MG/1
300 CAPSULE ORAL 3 TIMES DAILY
Qty: 90 CAPSULE | Refills: 3 | Status: SHIPPED | OUTPATIENT
Start: 2020-04-15 | End: 2020-10-23

## 2020-04-15 ASSESSMENT — ANXIETY QUESTIONNAIRES
GAD7 TOTAL SCORE: 0
5. BEING SO RESTLESS THAT IT IS HARD TO SIT STILL: NOT AT ALL
4. TROUBLE RELAXING: NOT AT ALL
3. WORRYING TOO MUCH ABOUT DIFFERENT THINGS: NOT AT ALL
7. FEELING AFRAID AS IF SOMETHING AWFUL MIGHT HAPPEN: NOT AT ALL
2. NOT BEING ABLE TO STOP OR CONTROL WORRYING: NOT AT ALL
6. BECOMING EASILY ANNOYED OR IRRITABLE: NOT AT ALL
1. FEELING NERVOUS, ANXIOUS, OR ON EDGE: NOT AT ALL

## 2020-04-15 ASSESSMENT — ENCOUNTER SYMPTOMS
JOINT SWELLING: 1
ARTHRALGIAS: 1
BACK PAIN: 1
ACTIVITY CHANGE: 1
WEAKNESS: 1

## 2020-04-15 ASSESSMENT — PATIENT HEALTH QUESTIONNAIRE - PHQ9: SUM OF ALL RESPONSES TO PHQ QUESTIONS 1-9: 0
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
## 2020-04-16 ASSESSMENT — ANXIETY QUESTIONNAIRES: GAD7 TOTAL SCORE: 0

## 2020-04-27 ENCOUNTER — TELEPHONE (OUTPATIENT)
Dept: FAMILY MEDICINE | Facility: OTHER | Age: 55
End: 2020-04-27
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
## 2020-04-27 NOTE — TELEPHONE ENCOUNTER
Patient would like a referral for the MRI for her leg to be scheduled somewhere that has a stand up MRI.    [phone]
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
## 2020-04-28 ENCOUNTER — TELEPHONE (OUTPATIENT)
Dept: FAMILY MEDICINE | Facility: OTHER | Age: 55
End: 2020-04-28
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
## 2020-04-29 ENCOUNTER — VIRTUAL VISIT (OUTPATIENT)
Dept: FAMILY MEDICINE | Facility: OTHER | Age: 55
End: 2020-04-29
Attending: PHYSICIAN ASSISTANT
Payer: OTHER MISCELLANEOUS

## 2020-04-29 DIAGNOSIS — M54.42 ACUTE BILATERAL LOW BACK PAIN WITH LEFT-SIDED SCIATICA: Primary | ICD-10-CM

## 2020-04-29 PROCEDURE — 99213 OFFICE O/P EST LOW 20 MIN: CPT | Mod: 95 | Performed by: PHYSICIAN ASSISTANT

## 2020-04-29 ASSESSMENT — PAIN SCALES - GENERAL: PAINLEVEL: MILD PAIN (2)
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
## 2020-05-06 ENCOUNTER — TELEPHONE (OUTPATIENT)
Dept: FAMILY MEDICINE | Facility: OTHER | Age: 55
End: 2020-05-06

## 2020-05-06 NOTE — TELEPHONE ENCOUNTER
Usually they tell you how many hours to wear it.   Usually they use this all but 2 hours a day. That would be a question for her neurology MD. I would say yes on wearing it at night.

## 2020-05-06 NOTE — TELEPHONE ENCOUNTER
pt notified and will ask her neurologist. At this time pt asked if you had a chance to review her imaging reports so she could better understand the findings. Please Advise.   Eva Vines LPN

## 2020-05-08 NOTE — TELEPHONE ENCOUNTER
I explained this to pt, but she is wondering if you can further explain the meaning of the findings to better understand. She stated when they read the results to her the questions she was asking, the hoang couldn't give a direct answer for. Please advise.   Eva Vines LPN

## 2020-05-27 ENCOUNTER — TELEPHONE (OUTPATIENT)
Dept: FAMILY MEDICINE | Facility: OTHER | Age: 55
End: 2020-05-27

## 2020-05-27 ENCOUNTER — VIRTUAL VISIT (OUTPATIENT)
Dept: FAMILY MEDICINE | Facility: OTHER | Age: 55
End: 2020-05-27
Attending: PHYSICIAN ASSISTANT
Payer: OTHER MISCELLANEOUS

## 2020-05-27 VITALS — BODY MASS INDEX: 36.15 KG/M2 | WEIGHT: 224 LBS

## 2020-05-27 DIAGNOSIS — S22.081D CLOSED STABLE BURST FRACTURE OF TWELFTH THORACIC VERTEBRA WITH ROUTINE HEALING, SUBSEQUENT ENCOUNTER: Primary | ICD-10-CM

## 2020-05-27 DIAGNOSIS — S32.001D CLOSED BURST FRACTURE OF LUMBAR VERTEBRA WITH ROUTINE HEALING, SUBSEQUENT ENCOUNTER: Primary | ICD-10-CM

## 2020-05-27 PROCEDURE — 99213 OFFICE O/P EST LOW 20 MIN: CPT | Mod: 95 | Performed by: PHYSICIAN ASSISTANT

## 2020-05-27 ASSESSMENT — PAIN SCALES - GENERAL: PAINLEVEL: MILD PAIN (2)
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
## 2020-05-27 NOTE — PATIENT INSTRUCTIONS
Thank you for choosing [facility].   I have office hours 8:00 am to 4:30 pm on Monday's, Wednesday's, Thursday's and Friday's. My nurse and I are out of the office every Tuesday.    Following your visit, when your labs and diagnostic testing have returned, I will review then and you will be contacted by my nurse.  If you are on My Chart, you can also view results there.    For refills, notify your pharmacy regarding what you need and the pharmacy will generate a refill request. Do not call my nurse as she is unable to process refill request. Please plan ahead and allow 3-5 days for refill requests.    You will generally receive a reminder call the day prior to your appointment.  If you cannot attend your appointment, please cancel your appointment with as much notice as possible.  If there is a pattern of failure to present for your appointments, I cannot provide consistent, meaningful, ongoing care for you. It is very important to me that you come in for your care, so we can best assist you with your health care needs.    IMPORTANT:  Please note that it is my standard of practice to NOT participate in prescribing ongoing requested Narcotic Analgesic therapy, and/or participate in the prescribing of other controlled substances.  My nurse and I am happy to assist you with the process of referral for alternative pain management as needed, and other treatment modalities including but not limited to:  Physical Therapy, Physical Medicine and Rehab, Counseling, Chiropractic Care, Orthopedic Care, and non-narcotic medication management.     In the event that you need to be seen for emergent concerns and I am out of office,  please see one of my colleagues for acute concerns.  You may also present to  or ER.  I appreciate the opportunity to serve you and look forward to supporting your healthcare needs in the future. Please contact me with any questions or concerns that you may  have.    Sincerely,      Rosetta Suarez RN, PA-C

## 2020-05-27 NOTE — NURSING NOTE
"Chief Complaint   Patient presents with     Work Comp       Initial Wt 101.6 kg (224 lb)   BMI 36.15 kg/m   Estimated body mass index is 36.15 kg/m  as calculated from the following:    Height as of 3/4/19: 1.676 m (5' 6\").    Weight as of this encounter: 101.6 kg (224 lb).  Medication Reconciliation: complete  Tanika Maki LPN    "

## 2020-05-27 NOTE — TELEPHONE ENCOUNTER
Pt called, states that her physical therapist recommended she get orthotics for her feet. Would like order sent to [facility] in [place]. Pended. Please advise. Thank you!

## 2020-05-28 NOTE — TELEPHONE ENCOUNTER
I did make referral she can just go there and get her stuff done. This is however work comp. So needs approval through them.

## 2020-06-04 ENCOUNTER — TELEPHONE (OUTPATIENT)
Dept: FAMILY MEDICINE | Facility: OTHER | Age: 55
End: 2020-06-04

## 2020-06-04 NOTE — TELEPHONE ENCOUNTER
Baljit Mccoy with [facility] called requesting a copy of the patients orthotic order to be faxed to Baljit at the fax number provided below for insurance coverage.     Patient was in PT, therapist recommended orthotics, Rosetta Suarez ordered the orthotics.      Fax: [phone]  Phone: [phone] (Baljit, if you have any questions)

## 2020-06-16 ENCOUNTER — TELEPHONE (OUTPATIENT)
Dept: FAMILY MEDICINE | Facility: OTHER | Age: 55
End: 2020-06-16

## 2020-06-16 NOTE — TELEPHONE ENCOUNTER
Patient calling states that the orthotics ordered by the Doctor were approved by work comp. Upon checking with the [facility] she was told that they did not have an order for the orthotics. She is requesting that the order for these be re faxed to the [facility]. Ashley LeChevalier LPN

## 2020-06-22 ENCOUNTER — VIRTUAL VISIT (OUTPATIENT)
Dept: FAMILY MEDICINE | Facility: OTHER | Age: 55
End: 2020-06-22
Attending: PHYSICIAN ASSISTANT
Payer: OTHER MISCELLANEOUS

## 2020-06-22 DIAGNOSIS — F33.1 MODERATE EPISODE OF RECURRENT MAJOR DEPRESSIVE DISORDER (H): Primary | ICD-10-CM

## 2020-06-22 PROCEDURE — 99214 OFFICE O/P EST MOD 30 MIN: CPT | Mod: 95 | Performed by: PHYSICIAN ASSISTANT

## 2020-06-22 RX ORDER — ESCITALOPRAM OXALATE 20 MG/1
20 TABLET ORAL DAILY
Qty: 90 TABLET | Refills: 1 | Status: SHIPPED | OUTPATIENT
Start: 2020-06-22 | End: 2020-10-23

## 2020-06-22 ASSESSMENT — ANXIETY QUESTIONNAIRES
7. FEELING AFRAID AS IF SOMETHING AWFUL MIGHT HAPPEN: SEVERAL DAYS
1. FEELING NERVOUS, ANXIOUS, OR ON EDGE: NEARLY EVERY DAY
IF YOU CHECKED OFF ANY PROBLEMS ON THIS QUESTIONNAIRE, HOW DIFFICULT HAVE THESE PROBLEMS MADE IT FOR YOU TO DO YOUR WORK, TAKE CARE OF THINGS AT HOME, OR GET ALONG WITH OTHER PEOPLE: SOMEWHAT DIFFICULT
5. BEING SO RESTLESS THAT IT IS HARD TO SIT STILL: NOT AT ALL
4. TROUBLE RELAXING: NEARLY EVERY DAY
GAD7 TOTAL SCORE: 16
3. WORRYING TOO MUCH ABOUT DIFFERENT THINGS: NEARLY EVERY DAY
2. NOT BEING ABLE TO STOP OR CONTROL WORRYING: NEARLY EVERY DAY
6. BECOMING EASILY ANNOYED OR IRRITABLE: NEARLY EVERY DAY

## 2020-06-22 ASSESSMENT — PATIENT HEALTH QUESTIONNAIRE - PHQ9: SUM OF ALL RESPONSES TO PHQ QUESTIONS 1-9: 18
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
## 2020-06-22 NOTE — LETTER
My Depression Action Plan  Name: Izzy Shultz   Date of Birth 1965  Date: 6/22/2020    My doctor: Rosetta Suarez   My clinic: [facility] [place]  [facility] [place] [place] [address]  [phone]          GREEN    ZONE   Good Control    What it looks like:     Things are going generally well. You have normal ups and downs. You may even feel depressed from time to time, but bad moods usually last less than a day.   What you need to do:  1. Continue to care for yourself (see self care plan)  2. Check your depression survival kit and update it as needed  3. Follow your physician s recommendations including any medication.  4. Do not stop taking medication unless you consult with your physician first.           YELLOW         ZONE Getting Worse    What it looks like:     Depression is starting to interfere with your life.     It may be hard to get out of bed; you may be starting to isolate yourself from others.    Symptoms of depression are starting to last most all day and this has happened for several days.     You may have suicidal thoughts but they are not constant.   What you need to do:     1. Call your care team. Your response to treatment will improve if you keep your care team informed of your progress. Yellow periods are signs an adjustment may need to be made.     2. Continue your self-care.  Just get dressed and ready for the day.  Don't give yourself time to talk yourself out of it.    3. Talk to someone in your support network.    4. Open up your Depression Self-Care Plan/Wellness Kit.           RED    ZONE Medical Alert - Get Help    What it looks like:     Depression is seriously interfering with your life.     You may experience these or other symptoms: You can t get out of bed most days, can t work or engage in other necessary activities, you have trouble taking care of basic hygiene, or basic responsibilities, thoughts of suicide or death that will not go away,  self-injurious behavior.     What you need to do:  1. Call your care team and request a same-day appointment. If they are not available (weekends or after hours) call your local crisis line, emergency room or 911.            Depression Self-Care Plan / Wellness Kit    Self-Care for Depression  Here s the deal. Your body and mind are really not as separate as most people think.  What you do and think affects how you feel and how you feel influences what you do and think. This means if you do things that people who feel good do, it will help you feel better.  Sometimes this is all it takes.  There is also a place for medication and therapy depending on how severe your depression is, so be sure to consult with your medical provider and/ or Behavioral Health Consultant if your symptoms are worsening or not improving.     In order to better manage my stress, I will:    Exercise  Get some form of exercise, every day. This will help reduce pain and release endorphins, the  feel good  chemicals in your brain. This is almost as good as taking antidepressants!  This is not the same as joining a gym and then never going! (they count on that by the way ) It can be as simple as just going for a walk or doing some gardening, anything that will get you moving.      Hygiene   Maintain good hygiene (get out of bed in the morning, make your bed, brush your teeth, take a shower, and get dressed like you were going to work, even if you are unemployed).  If your clothes don't fit try to get ones that do.    Diet  Strive to eat foods that are good for me, drink plenty of water, and avoid excessive sugar, caffeine, alcohol, and other mood-altering substances.  Some foods that are helpful in depression are: complex carbohydrates, B vitamins, flaxseed, fish or fish oil, fresh fruits and vegetables.    Psychotherapy  Agree to participate in Individual Therapy (if recommended).    Medication  If prescribed medications, I agree to take them.   Missing doses can result in serious side effects.  I understand that drinking alcohol, or other illicit drug use, may cause potential side effects.  I will not stop my medication abruptly without first discussing it with my provider.    Staying Connected With Others  Stay in touch with my friends, family members, and my primary care provider/team.    Use your imagination  Be creative.  We all have a creative side; it doesn t matter if it s oil painting, sand castles, or mud pies! This will also kick up the endorphins.    Witness Beauty  (AKA stop and smell the roses) Take a look outside, even in mid-winter. Notice colors, textures. Watch the squirrels and birds.     Service to others  Be of service to others.  There is always someone else in need.  By helping others we can  get out of ourselves  and remember the really important things.  This also provides opportunities for practicing all the other parts of the program.    Humor  Laugh and be silly!  Adjust your TV habits for less news and crime-drama and more comedy.    Control your stress  Try breathing deep, massage therapy, biofeedback, and meditation. Find time to relax each day.     Crisis Text Line  http://www.crisistextline.org    The Crisis Text Line serves anyone, in any type of crisis, providing access to free, 24/7 support and information via the medium people already use and trust:    Here's how it works:  1.  Text 060-154 from anywhere in the [place], anytime, about any type of crisis.  2.  A live, trained Crisis Counselor receives the text and responds quickly.  3.  The volunteer Crisis Counselor will help you move from a 'hot moment to a cool moment'.    My support system    Clinic Contact:  Phone number:    Contact 1:  Phone number:    Contact 2:  Phone number:    [employer]abaptist/:  Phone number:    Therapist:  Phone number:    Local crisis center:    Phone number:    Other community support:  Phone number:

## 2020-06-23 ASSESSMENT — ANXIETY QUESTIONNAIRES: GAD7 TOTAL SCORE: 16

## 2020-06-25 ENCOUNTER — TELEPHONE (OUTPATIENT)
Dept: FAMILY MEDICINE | Facility: OTHER | Age: 55
End: 2020-06-25
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
## 2020-06-26 NOTE — TELEPHONE ENCOUNTER
That is fine we can put her on Monday for a phone visit and we can then get things ordered. I do not think phone visit are ok however?  I better check.

## 2020-06-29 ENCOUNTER — VIRTUAL VISIT (OUTPATIENT)
Dept: FAMILY MEDICINE | Facility: OTHER | Age: 55
End: 2020-06-29
Attending: PHYSICIAN ASSISTANT
Payer: OTHER MISCELLANEOUS

## 2020-06-29 DIAGNOSIS — M51.16 LUMBAR DISC PROLAPSE WITH ROOT COMPRESSION: ICD-10-CM

## 2020-06-29 DIAGNOSIS — S22.081S: Primary | ICD-10-CM

## 2020-06-29 PROCEDURE — 99213 OFFICE O/P EST LOW 20 MIN: CPT | Mod: 95 | Performed by: PHYSICIAN ASSISTANT
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
## 2020-07-02 ENCOUNTER — MEDICAL CORRESPONDENCE (OUTPATIENT)
Dept: HEALTH INFORMATION MANAGEMENT | Facility: CLINIC | Age: 55
End: 2020-07-02

## 2020-07-07 ENCOUNTER — HOSPITAL ENCOUNTER (OUTPATIENT)
Dept: MRI IMAGING | Facility: HOSPITAL | Age: 55
End: 2020-07-07
Attending: PHYSICIAN ASSISTANT
Payer: OTHER MISCELLANEOUS
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
## 2020-07-07 DIAGNOSIS — M23.91 KNEE LOCKING, RIGHT: ICD-10-CM

## 2020-07-07 PROCEDURE — 73721 MRI JNT OF LWR EXTRE W/O DYE: CPT | Mod: TC,RT

## 2020-07-07 PROCEDURE — 73721 MRI JNT OF LWR EXTRE W/O DYE: CPT | Mod: TC,RT,XS

## 2020-07-07 PROCEDURE — 73718 MRI LOWER EXTREMITY W/O DYE: CPT | Mod: TC,RT

## 2020-07-14 ENCOUNTER — TELEPHONE (OUTPATIENT)
Dept: FAMILY MEDICINE | Facility: OTHER | Age: 55
End: 2020-07-14
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
## 2020-07-14 NOTE — TELEPHONE ENCOUNTER
Patient called and stated that she was supposed to have orders for OT. Patient stated it is supposed to be done in [place] at . Please advise as no orders have been placed.

## 2020-07-16 ENCOUNTER — TELEPHONE (OUTPATIENT)
Dept: FAMILY MEDICINE | Facility: OTHER | Age: 55
End: 2020-07-16

## 2020-07-16 DIAGNOSIS — S32.000S CLOSED WEDGE FRACTURE OF LUMBAR VERTEBRA, UNSPECIFIED LUMBAR VERTEBRAL LEVEL, SEQUELA: Primary | ICD-10-CM

## 2020-07-16 DIAGNOSIS — S32.001D CLOSED STABLE BURST FRACTURE OF LUMBAR VERTEBRA WITH ROUTINE HEALING, UNSPECIFIED LUMBAR VERTEBRAL LEVEL, SUBSEQUENT ENCOUNTER: Primary | ICD-10-CM

## 2020-07-16 NOTE — TELEPHONE ENCOUNTER
Patient reporting appt at [facility] in [place] on 7/16/20. [facility] has received the order for inserts for the patients shoes.     Did not receive order for the shoes.     Patient requesting order to go to [facility] in [place].

## 2020-07-21 NOTE — TELEPHONE ENCOUNTER
Per last visit with carolyn on 6-29-20      Assessment/Plan:  1. Closed stable burst fracture of twelfth thoracic vertebra, sequela  She is going to be having inserts and new shoes. Will need need shoes every year.  This will be on going with her work comp. This is going to relief some of her gait issues she is having. So she was fitted and will be scripted both shoes and inserts. Will need inserts for boots and other foot devices from work.  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      2. Lumbar disc prolapse with root compression  Seeing neurosurgery. Will need to have continued PT and no longer in brace or using walker. Still not healed.

## 2020-07-23 ENCOUNTER — OFFICE VISIT (OUTPATIENT)
Dept: FAMILY MEDICINE | Facility: OTHER | Age: 55
End: 2020-07-23
Attending: PHYSICIAN ASSISTANT
Payer: OTHER MISCELLANEOUS

## 2020-07-23 ENCOUNTER — MEDICAL CORRESPONDENCE (OUTPATIENT)
Dept: HEALTH INFORMATION MANAGEMENT | Facility: HOSPITAL | Age: 55
End: 2020-07-23

## 2020-07-23 ENCOUNTER — TELEPHONE (OUTPATIENT)
Dept: FAMILY MEDICINE | Facility: OTHER | Age: 55
End: 2020-07-23

## 2020-07-23 VITALS
BODY MASS INDEX: 32.58 KG/M2 | HEART RATE: 60 BPM | HEIGHT: 69 IN | SYSTOLIC BLOOD PRESSURE: 132 MMHG | TEMPERATURE: 97 F | DIASTOLIC BLOOD PRESSURE: 70 MMHG | WEIGHT: 220 LBS | OXYGEN SATURATION: 98 %

## 2020-07-23 DIAGNOSIS — S22.081D CLOSED STABLE BURST FRACTURE OF TWELFTH THORACIC VERTEBRA WITH ROUTINE HEALING, SUBSEQUENT ENCOUNTER: Primary | ICD-10-CM

## 2020-07-23 DIAGNOSIS — M25.561 ACUTE PAIN OF RIGHT KNEE: ICD-10-CM

## 2020-07-23 PROBLEM — S22.089A T12 VERTEBRAL FRACTURE (H): Status: ACTIVE | Noted: 2020-03-30
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
## 2020-07-23 PROCEDURE — 99213 OFFICE O/P EST LOW 20 MIN: CPT | Performed by: PHYSICIAN ASSISTANT

## 2020-07-23 ASSESSMENT — ANXIETY QUESTIONNAIRES
6. BECOMING EASILY ANNOYED OR IRRITABLE: SEVERAL DAYS
2. NOT BEING ABLE TO STOP OR CONTROL WORRYING: NEARLY EVERY DAY
GAD7 TOTAL SCORE: 13
7. FEELING AFRAID AS IF SOMETHING AWFUL MIGHT HAPPEN: NEARLY EVERY DAY
1. FEELING NERVOUS, ANXIOUS, OR ON EDGE: SEVERAL DAYS
4. TROUBLE RELAXING: NEARLY EVERY DAY
3. WORRYING TOO MUCH ABOUT DIFFERENT THINGS: SEVERAL DAYS
5. BEING SO RESTLESS THAT IT IS HARD TO SIT STILL: SEVERAL DAYS

## 2020-07-23 ASSESSMENT — PATIENT HEALTH QUESTIONNAIRE - PHQ9: SUM OF ALL RESPONSES TO PHQ QUESTIONS 1-9: 5

## 2020-07-23 ASSESSMENT — MIFFLIN-ST. JEOR: SCORE: 1657.29

## 2020-07-23 ASSESSMENT — PAIN SCALES - GENERAL: PAINLEVEL: NO PAIN (0)

## 2020-07-23 NOTE — TELEPHONE ENCOUNTER
Please call patient back, questioning why back brace was ordered. PCP's nurse to call pt. Thank you
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
## 2020-07-23 NOTE — PATIENT INSTRUCTIONS
Thank you for choosing [facility].   I have office hours 8:00 am to 4:30 pm on Monday's, Wednesday's, Thursday's and Friday's. My nurse and I are out of the office every Tuesday.    Following your visit, when your labs and diagnostic testing have returned, I will review then and you will be contacted by my nurse.  If you are on My Chart, you can also view results there.    For refills, notify your pharmacy regarding what you need and the pharmacy will generate a refill request. Do not call my nurse as she is unable to process refill request. Please plan ahead and allow 3-5 days for refill requests.    You will generally receive a reminder call the day prior to your appointment.  If you cannot attend your appointment, please cancel your appointment with as much notice as possible.  If there is a pattern of failure to present for your appointments, I cannot provide consistent, meaningful, ongoing care for you. It is very important to me that you come in for your care, so we can best assist you with your health care needs.    IMPORTANT:  Please note that it is my standard of practice to NOT participate in prescribing ongoing requested Narcotic Analgesic therapy, and/or participate in the prescribing of other controlled substances.  My nurse and I am happy to assist you with the process of referral for alternative pain management as needed, and other treatment modalities including but not limited to:  Physical Therapy, Physical Medicine and Rehab, Counseling, Chiropractic Care, Orthopedic Care, and non-narcotic medication management.     In the event that you need to be seen for emergent concerns and I am out of office,  please see one of my colleagues for acute concerns.  You may also present to  or ER.  I appreciate the opportunity to serve you and look forward to supporting your healthcare needs in the future. Please contact me with any questions or concerns that you may  have.    Sincerely,      Rosetta Suarez RN, PA-C

## 2020-07-23 NOTE — NURSING NOTE
"Chief Complaint   Patient presents with     follow up work comp     Depression       Initial /70 (BP Location: Right arm, Patient Position: Sitting, Cuff Size: Adult Regular)   Pulse 60   Temp 97  F (36.1  C) (Tympanic)   Ht 1.753 m (5' 9\")   Wt 99.8 kg (220 lb)   SpO2 98%   BMI 32.49 kg/m   Estimated body mass index is 32.49 kg/m  as calculated from the following:    Height as of this encounter: 1.753 m (5' 9\").    Weight as of this encounter: 99.8 kg (220 lb).  Medication Reconciliation: complete  Zuleyka Kellogg LPN  "

## 2020-07-24 ASSESSMENT — ANXIETY QUESTIONNAIRES: GAD7 TOTAL SCORE: 13

## 2020-07-24 NOTE — TELEPHONE ENCOUNTER
Called and left message for patient that PCP did not order back brace and to call her neurosurgeon to see.Zuleyka Kellogg LPN

## 2020-07-30 ENCOUNTER — MEDICAL CORRESPONDENCE (OUTPATIENT)
Dept: HEALTH INFORMATION MANAGEMENT | Facility: CLINIC | Age: 55
End: 2020-07-30

## 2020-08-16 ENCOUNTER — MEDICAL CORRESPONDENCE (OUTPATIENT)
Dept: HEALTH INFORMATION MANAGEMENT | Facility: CLINIC | Age: 55
End: 2020-08-16

## 2020-08-21 ENCOUNTER — TRANSFERRED RECORDS (OUTPATIENT)
Dept: HEALTH INFORMATION MANAGEMENT | Facility: CLINIC | Age: 55
End: 2020-08-21

## 2020-09-02 DIAGNOSIS — E03.9 ACQUIRED HYPOTHYROIDISM: ICD-10-CM

## 2020-09-02 NOTE — TELEPHONE ENCOUNTER
Synthroid       Last Written Prescription Date:  10/18/2019  Last Fill Quantity: 90,   # refills: 1  Last Office Visit: 7/23/2020  Future Office visit:    Next 5 appointments (look out 90 days)    Oct 23, 2020 11:20 AM CDT  (Arrive by 11:05 AM)  SHORT with JOSE aRmos  [facility] [place] ([facility] [place] ) [address]  [place] [address]  [phone]

## 2020-09-03 RX ORDER — LEVOTHYROXINE SODIUM 75 UG/1
TABLET ORAL
Qty: 90 TABLET | Refills: 0 | Status: SHIPPED | OUTPATIENT
Start: 2020-09-03 | End: 2020-10-23

## 2020-09-03 NOTE — TELEPHONE ENCOUNTER
Pt of [place]  Thyroid Protocol Vbvtag9209/02/2020 10:56 AM   Normal TSH on file in past 12 months     Next 5 appointments (look out 90 days)    Oct 23, 2020 11:20 AM CDT  (Arrive by 11:05 AM)  SHORT with JOSE Ramos  [facility] ([facility] ) [address]  [place] [address]  [phone]        Pt does have upcoming appt.Pended a TSH.      Octavia Diallo RN
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
## 2020-10-23 ENCOUNTER — OFFICE VISIT (OUTPATIENT)
Dept: FAMILY MEDICINE | Facility: OTHER | Age: 55
End: 2020-10-23
Attending: PHYSICIAN ASSISTANT
Payer: OTHER MISCELLANEOUS

## 2020-10-23 ENCOUNTER — TELEPHONE (OUTPATIENT)
Dept: FAMILY MEDICINE | Facility: OTHER | Age: 55
End: 2020-10-23

## 2020-10-23 VITALS
BODY MASS INDEX: 32.49 KG/M2 | OXYGEN SATURATION: 96 % | TEMPERATURE: 97 F | SYSTOLIC BLOOD PRESSURE: 122 MMHG | WEIGHT: 220 LBS | DIASTOLIC BLOOD PRESSURE: 76 MMHG | HEART RATE: 63 BPM

## 2020-10-23 DIAGNOSIS — E03.9 ACQUIRED HYPOTHYROIDISM: Primary | ICD-10-CM

## 2020-10-23 DIAGNOSIS — F33.1 MODERATE EPISODE OF RECURRENT MAJOR DEPRESSIVE DISORDER (H): ICD-10-CM

## 2020-10-23 DIAGNOSIS — S22.080S T12 COMPRESSION FRACTURE, SEQUELA: Primary | ICD-10-CM

## 2020-10-23 PROCEDURE — 99213 OFFICE O/P EST LOW 20 MIN: CPT | Performed by: PHYSICIAN ASSISTANT

## 2020-10-23 RX ORDER — ESCITALOPRAM OXALATE 20 MG/1
20 TABLET ORAL DAILY
Qty: 90 TABLET | Refills: 1 | Status: SHIPPED | OUTPATIENT
Start: 2020-10-23 | End: 2021-09-15

## 2020-10-23 RX ORDER — LEVOTHYROXINE SODIUM 150 UG/1
150 TABLET ORAL DAILY
Qty: 90 TABLET | Refills: 3 | Status: SHIPPED | OUTPATIENT
Start: 2020-10-23 | End: 2021-01-27

## 2020-10-23 ASSESSMENT — ANXIETY QUESTIONNAIRES
3. WORRYING TOO MUCH ABOUT DIFFERENT THINGS: NOT AT ALL
2. NOT BEING ABLE TO STOP OR CONTROL WORRYING: NOT AT ALL
7. FEELING AFRAID AS IF SOMETHING AWFUL MIGHT HAPPEN: NOT AT ALL
GAD7 TOTAL SCORE: 0
1. FEELING NERVOUS, ANXIOUS, OR ON EDGE: NOT AT ALL
6. BECOMING EASILY ANNOYED OR IRRITABLE: NOT AT ALL
5. BEING SO RESTLESS THAT IT IS HARD TO SIT STILL: NOT AT ALL
4. TROUBLE RELAXING: NOT AT ALL

## 2020-10-23 ASSESSMENT — PATIENT HEALTH QUESTIONNAIRE - PHQ9: SUM OF ALL RESPONSES TO PHQ QUESTIONS 1-9: 0

## 2020-10-23 NOTE — TELEPHONE ENCOUNTER
Phone call we changed medications doses. She will continue on Lexapro and also synthroid follow [facility] and given card.

## 2020-10-24 ASSESSMENT — ANXIETY QUESTIONNAIRES: GAD7 TOTAL SCORE: 0

## 2020-12-14 ENCOUNTER — HEALTH MAINTENANCE LETTER (OUTPATIENT)
Age: 55
End: 2020-12-14

## 2021-01-27 DIAGNOSIS — E03.9 ACQUIRED HYPOTHYROIDISM: ICD-10-CM

## 2021-01-27 RX ORDER — LEVOTHYROXINE SODIUM 150 UG/1
150 TABLET ORAL DAILY
Qty: 90 TABLET | Refills: 3 | Status: SHIPPED | OUTPATIENT
Start: 2021-01-27 | End: 2021-09-24

## 2021-01-27 NOTE — TELEPHONE ENCOUNTER
synthroid      Last Written Prescription Date:  Pt states changed from 150 mcgs to 75 mcgs  Last Fill Quantity: ,   # refills:   Last Office Visit: 10/23/2020  Future Office visit:

## 2021-01-28 NOTE — TELEPHONE ENCOUNTER
Lets do a TSH or if no insurance [facility] [place]  she can call and get her an appointment.

## 2021-03-02 DIAGNOSIS — M62.830 BACK MUSCLE SPASM: ICD-10-CM

## 2021-03-03 RX ORDER — CYCLOBENZAPRINE HCL 10 MG
10 TABLET ORAL 3 TIMES DAILY PRN
Qty: 90 TABLET | Refills: 1 | Status: SHIPPED | OUTPATIENT
Start: 2021-03-03 | End: 2021-09-24

## 2021-03-03 NOTE — TELEPHONE ENCOUNTER
flexeril      Last Written Prescription Date:  4/9/20- discontinued on 6/22/20  Last Fill Quantity: 90,   # refills: 1  Last Office Visit: 10/23/20  Future Office visit:       Routing refill request to provider for review/approval because:  Drug not active on patient's medication list

## 2021-04-18 ENCOUNTER — HEALTH MAINTENANCE LETTER (OUTPATIENT)
Age: 56
End: 2021-04-18

## 2021-09-11 DIAGNOSIS — F33.1 MODERATE EPISODE OF RECURRENT MAJOR DEPRESSIVE DISORDER (H): ICD-10-CM

## 2021-09-14 NOTE — TELEPHONE ENCOUNTER
Lexapro      Last Written Prescription Date:  6.28.21  Last Fill Quantity: #90,   # refills: 0  Last Office Visit: 10.23.20  Future Office visit:    Next 5 appointments (look out 90 days)    Sep 24, 2021  8:45 AM  (Arrive by 8:30 AM)  Office Visit with JOSE Ramos  [facility] [place] ([facility] [place] ) [address]  [place] [address]  [phone]           Routing refill request to provider for review/approval because:

## 2021-09-15 RX ORDER — ESCITALOPRAM OXALATE 20 MG/1
TABLET ORAL
Qty: 90 TABLET | Refills: 0 | Status: SHIPPED | OUTPATIENT
Start: 2021-09-15 | End: 2021-09-24

## 2021-09-24 ENCOUNTER — TELEPHONE (OUTPATIENT)
Dept: FAMILY MEDICINE | Facility: OTHER | Age: 56
End: 2021-09-24

## 2021-09-24 ENCOUNTER — OFFICE VISIT (OUTPATIENT)
Dept: FAMILY MEDICINE | Facility: OTHER | Age: 56
End: 2021-09-24
Attending: PHYSICIAN ASSISTANT
Payer: OTHER MISCELLANEOUS

## 2021-09-24 ENCOUNTER — LAB (OUTPATIENT)
Dept: LAB | Facility: OTHER | Age: 56
End: 2021-09-24

## 2021-09-24 VITALS
WEIGHT: 196.2 LBS | DIASTOLIC BLOOD PRESSURE: 84 MMHG | SYSTOLIC BLOOD PRESSURE: 118 MMHG | OXYGEN SATURATION: 99 % | BODY MASS INDEX: 28.97 KG/M2 | TEMPERATURE: 97 F | HEART RATE: 67 BPM

## 2021-09-24 DIAGNOSIS — E03.9 ACQUIRED HYPOTHYROIDISM: ICD-10-CM

## 2021-09-24 DIAGNOSIS — F33.1 MODERATE EPISODE OF RECURRENT MAJOR DEPRESSIVE DISORDER (H): ICD-10-CM

## 2021-09-24 DIAGNOSIS — M62.830 BACK MUSCLE SPASM: Primary | ICD-10-CM

## 2021-09-24 DIAGNOSIS — E03.9 ACQUIRED HYPOTHYROIDISM: Primary | ICD-10-CM

## 2021-09-24 LAB
T4 FREE SERPL-MCNC: 1.26 NG/DL (ref 0.76–1.46)
TSH SERPL DL<=0.005 MIU/L-ACNC: <0.01 MU/L (ref 0.4–4)

## 2021-09-24 PROCEDURE — 84443 ASSAY THYROID STIM HORMONE: CPT

## 2021-09-24 PROCEDURE — 99213 OFFICE O/P EST LOW 20 MIN: CPT | Performed by: PHYSICIAN ASSISTANT

## 2021-09-24 PROCEDURE — 36415 COLL VENOUS BLD VENIPUNCTURE: CPT

## 2021-09-24 PROCEDURE — 84439 ASSAY OF FREE THYROXINE: CPT

## 2021-09-24 RX ORDER — LEVOTHYROXINE SODIUM 125 UG/1
125 TABLET ORAL DAILY
Qty: 90 TABLET | Refills: 3 | Status: SHIPPED | OUTPATIENT
Start: 2021-09-24 | End: 2022-10-19

## 2021-09-24 RX ORDER — CYCLOBENZAPRINE HCL 10 MG
10 TABLET ORAL 3 TIMES DAILY PRN
Qty: 90 TABLET | Refills: 3 | Status: SHIPPED | OUTPATIENT
Start: 2021-09-24

## 2021-09-24 RX ORDER — ESCITALOPRAM OXALATE 20 MG/1
20 TABLET ORAL DAILY
Qty: 90 TABLET | Refills: 0 | Status: SHIPPED | OUTPATIENT
Start: 2021-09-24 | End: 2021-12-21

## 2021-09-24 ASSESSMENT — ANXIETY QUESTIONNAIRES
6. BECOMING EASILY ANNOYED OR IRRITABLE: NOT AT ALL
1. FEELING NERVOUS, ANXIOUS, OR ON EDGE: NOT AT ALL
4. TROUBLE RELAXING: NOT AT ALL
5. BEING SO RESTLESS THAT IT IS HARD TO SIT STILL: NOT AT ALL
GAD7 TOTAL SCORE: 0
2. NOT BEING ABLE TO STOP OR CONTROL WORRYING: NOT AT ALL
7. FEELING AFRAID AS IF SOMETHING AWFUL MIGHT HAPPEN: NOT AT ALL
3. WORRYING TOO MUCH ABOUT DIFFERENT THINGS: NOT AT ALL

## 2021-09-24 ASSESSMENT — PATIENT HEALTH QUESTIONNAIRE - PHQ9: SUM OF ALL RESPONSES TO PHQ QUESTIONS 1-9: 0

## 2021-09-24 NOTE — NURSING NOTE
"Chief Complaint   Patient presents with     Work Comp     back pain       Initial Blood Pressure 118/84 (BP Location: Right arm, Patient Position: Sitting, Cuff Size: Adult Regular)   Pulse 67   Temperature 97  F (36.1  C) (Tympanic)   Weight 89 kg (196 lb 3.2 oz)   Oxygen Saturation 99%   Body Mass Index 28.97 kg/m   Estimated body mass index is 28.97 kg/m  as calculated from the following:    Height as of 7/23/20: 1.753 m (5' 9\").    Weight as of this encounter: 89 kg (196 lb 3.2 oz).  Medication Reconciliation: complete  Zuleyka Kellogg LPN  "
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
## 2021-09-25 ASSESSMENT — ANXIETY QUESTIONNAIRES: GAD7 TOTAL SCORE: 0

## 2021-10-03 ENCOUNTER — HEALTH MAINTENANCE LETTER (OUTPATIENT)
Age: 56
End: 2021-10-03

## 2021-12-20 DIAGNOSIS — F33.1 MODERATE EPISODE OF RECURRENT MAJOR DEPRESSIVE DISORDER (H): ICD-10-CM

## 2021-12-20 DIAGNOSIS — M62.830 BACK MUSCLE SPASM: ICD-10-CM

## 2021-12-21 RX ORDER — ESCITALOPRAM OXALATE 20 MG/1
TABLET ORAL
Qty: 90 TABLET | Refills: 0 | Status: SHIPPED | OUTPATIENT
Start: 2021-12-21 | End: 2022-03-29

## 2021-12-21 NOTE — TELEPHONE ENCOUNTER
Lexapro      Last Written Prescription Date:  09/24/21  Last Fill Quantity: 90,   # refills: 0  Last Office Visit: 09/24/21  Future Office visit:

## 2022-03-29 DIAGNOSIS — F33.1 MODERATE EPISODE OF RECURRENT MAJOR DEPRESSIVE DISORDER (H): ICD-10-CM

## 2022-03-29 DIAGNOSIS — M62.830 BACK MUSCLE SPASM: ICD-10-CM

## 2022-03-29 RX ORDER — ESCITALOPRAM OXALATE 20 MG/1
20 TABLET ORAL DAILY
Qty: 90 TABLET | Refills: 0 | Status: SHIPPED | OUTPATIENT
Start: 2022-03-29 | End: 2022-07-20

## 2022-03-29 NOTE — TELEPHONE ENCOUNTER
Lexapro  Last Written Prescription Date: 12/21/21  Last Fill Quantity: 90 # of Refills: 0  Last Office Visit: 9/24/21

## 2022-05-14 ENCOUNTER — HEALTH MAINTENANCE LETTER (OUTPATIENT)
Age: 57
End: 2022-05-14

## 2022-07-18 DIAGNOSIS — M62.830 BACK MUSCLE SPASM: ICD-10-CM

## 2022-07-18 DIAGNOSIS — F33.1 MODERATE EPISODE OF RECURRENT MAJOR DEPRESSIVE DISORDER (H): ICD-10-CM

## 2022-07-20 NOTE — TELEPHONE ENCOUNTER
lov 09/24/21  escitalopram (LEXAPRO) 20 MG tablet 90 tablet 0 3/29/2022       Future Office visit:       Routing refill request to provider for review/approval because:

## 2022-07-21 RX ORDER — ESCITALOPRAM OXALATE 20 MG/1
TABLET ORAL
Qty: 90 TABLET | Refills: 1 | Status: SHIPPED | OUTPATIENT
Start: 2022-07-21 | End: 2023-05-24

## 2022-09-04 ENCOUNTER — HEALTH MAINTENANCE LETTER (OUTPATIENT)
Age: 57
End: 2022-09-04

## 2022-10-15 DIAGNOSIS — E03.9 ACQUIRED HYPOTHYROIDISM: ICD-10-CM

## 2022-10-18 NOTE — TELEPHONE ENCOUNTER
Levothyroxine       Last Written Prescription Date:  9/24/2021  Last Fill Quantity: 90,   # refills: 3  Last Office Visit: 9/24/2021  Future Office visit:

## 2022-10-19 RX ORDER — LEVOTHYROXINE SODIUM 125 UG/1
TABLET ORAL
Qty: 90 TABLET | Refills: 0 | Status: SHIPPED | OUTPATIENT
Start: 2022-10-19 | End: 2023-02-14

## 2023-02-12 DIAGNOSIS — E03.9 ACQUIRED HYPOTHYROIDISM: ICD-10-CM

## 2023-02-14 RX ORDER — LEVOTHYROXINE SODIUM 125 UG/1
TABLET ORAL
Qty: 90 TABLET | Refills: 0 | Status: SHIPPED | OUTPATIENT
Start: 2023-02-14 | End: 2023-05-24

## 2023-02-14 NOTE — TELEPHONE ENCOUNTER
levothyroxine (SYNTHROID/LEVOTHROID) 125 MCG tablet     Last Written Prescription Date:  10-19-22  Last Fill Quantity: 90,   # refills: 0  Last Office Visit: 9-24-22  Future Office visit:       Routing refill request to provider for review/approval because:    Thyroid Protocol Failed 02/12/2023 11:35 AM   Protocol Details  Recent (12 mo) or future (30 days) visit within the authorizing provider's specialty    Normal TSH on file in past 12 months     TSH   Date Value Ref Range Status   09/24/2021 <0.01 (L) 0.40 - 4.00 mU/L Final   03/04/2019 2.39 0.40 - 4.00 mU/L Final

## 2023-03-27 NOTE — TELEPHONE ENCOUNTER
I spoke with Izzy - she does not have medical insurance at this time. She stated she will have these labs done with the [facility], I provided her with the address and phone number. She also stated that she have them send the lab result over to Rosetta Suarez.

## 2023-03-31 NOTE — TELEPHONE ENCOUNTER
Were these filled Nadia?  Just needing to make sure.  [facility] and is good with follow up.

## 2023-05-10 ENCOUNTER — TELEPHONE (OUTPATIENT)
Dept: FAMILY MEDICINE | Facility: OTHER | Age: 58
End: 2023-05-10

## 2023-05-10 DIAGNOSIS — E03.9 ACQUIRED HYPOTHYROIDISM: Primary | ICD-10-CM

## 2023-05-10 NOTE — TELEPHONE ENCOUNTER
1:29 PM    Reason for Call: Phone Call    Description: Patient calling needing to speak to Rosetta Suarez nurse about lab orders for thyroid. Has a few questions about a free clinic about getting the lab done at or going to [place]? Is there a free clinic in the [facility] or [facility]?        Preferred method for responding to this message: Telephone Call  What is your phone number ? [phone]    If we cannot reach you directly, may we leave a detailed response at the number you provided? Yes    Can this message wait until your PCP/provider returns, if available today? YES, No

## 2023-05-11 NOTE — TELEPHONE ENCOUNTER
Patient notified. States that she is awaiting a call back from the free clinic. She is wondering if Rosetta Suarez would send labs over to [facility] for her to complete as she doesn't have insurance.

## 2023-05-11 NOTE — TELEPHONE ENCOUNTER
There is a free clinic in [place] on Monday nights. Not sure about [place] any more.  But should call [facility] . There should be a number to give to her on this.

## 2023-05-22 ENCOUNTER — LAB (OUTPATIENT)
Dept: LAB | Facility: HOSPITAL | Age: 58
End: 2023-05-22

## 2023-05-22 DIAGNOSIS — E03.9 ACQUIRED HYPOTHYROIDISM: ICD-10-CM

## 2023-05-22 DIAGNOSIS — F33.1 MODERATE EPISODE OF RECURRENT MAJOR DEPRESSIVE DISORDER (H): ICD-10-CM

## 2023-05-22 DIAGNOSIS — M62.830 BACK MUSCLE SPASM: ICD-10-CM

## 2023-05-22 LAB
T4 FREE SERPL-MCNC: 0.92 NG/DL (ref 0.9–1.7)
TSH SERPL DL<=0.005 MIU/L-ACNC: 4.63 UIU/ML (ref 0.3–4.2)

## 2023-05-22 PROCEDURE — 84443 ASSAY THYROID STIM HORMONE: CPT

## 2023-05-22 PROCEDURE — 84439 ASSAY OF FREE THYROXINE: CPT

## 2023-05-22 PROCEDURE — 36415 COLL VENOUS BLD VENIPUNCTURE: CPT

## 2023-05-24 RX ORDER — LEVOTHYROXINE SODIUM 125 UG/1
TABLET ORAL
Qty: 90 TABLET | Refills: 0 | Status: SHIPPED | OUTPATIENT
Start: 2023-05-24

## 2023-05-24 RX ORDER — ESCITALOPRAM OXALATE 20 MG/1
TABLET ORAL
Qty: 90 TABLET | Refills: 0 | Status: SHIPPED | OUTPATIENT
Start: 2023-05-24 | End: 2023-08-15

## 2023-05-24 NOTE — TELEPHONE ENCOUNTER
Failed protocol    TSH   Date Value Ref Range Status   05/22/2023 4.63 (H) 0.30 - 4.20 uIU/mL Final   09/24/2021 <0.01 (L) 0.40 - 4.00 mU/L Final   03/04/2019 2.39 0.40 - 4.00 mU/L Final         7/23/2020     9:00 AM 10/23/2020    11:00 AM 9/24/2021     8:00 AM   PHQ   PHQ-9 Total Score 5 0 0   Q9: Thoughts of better off dead/self-harm past 2 weeks Not at all Not at all Not at all        escitalopram (LEXAPRO) 20 MG tablet      Last Written Prescription Date:  7/21/22  Last Fill Quantity: 90,   # refills: 1  Last Office Visit: 9/24/21  Future Office visit:       Routing refill request to provider for review/approval because:  Drug not on the [facility], [facility] or [facility] refill protocol or controlled substance      levothyroxine (SYNTHROID/LEVOTHROID) 125 MCG tablet      Last Written Prescription Date:  2/14/23  Last Fill Quantity: 90,   # refills: 0  Last Office Visit: 9/24/21  Future Office visit:       Routing refill request to provider for review/approval because:

## 2023-05-24 NOTE — RESULT ENCOUNTER NOTE
Needing a little increase in dose. Her labs are done for [facility]. They are taking care of this. Please find out dose and we will increase . All paper needs to be sent over to [facility]

## 2023-06-02 ENCOUNTER — TELEPHONE (OUTPATIENT)
Dept: FAMILY MEDICINE | Facility: OTHER | Age: 58
End: 2023-06-02

## 2023-06-02 NOTE — TELEPHONE ENCOUNTER
9:58 AM    Reason for Call: Phone Call    Description:Izzy called to just let you know about  her medication / Lezothyroxin 125 MSG Tablets 1 time daily    Was an appointment offered for this call? No  If yes : Appointment type              Date    Preferred method for responding to this message: Telephone Call  What is your phone number ? [phone]    If we cannot reach you directly, may we leave a detailed response at the number you provided? Yes    Can this message wait until your PCP/provider returns, if available today? Not applicable    Kat Abreu

## 2023-06-03 ENCOUNTER — HEALTH MAINTENANCE LETTER (OUTPATIENT)
Age: 58
End: 2023-06-03

## 2023-06-05 NOTE — TELEPHONE ENCOUNTER
Patient called back requesting a call back.    What should she increase the med to    levothyroxine (SYNTHROID/LEVOTHROID) 125 MCG tablet 90 tablet 0 5/24/2023  Yes   Sig: Take 1 tablet by mouth once daily   Sent to pharmacy as: Levothyroxine Sodium 125 MCG Oral Tablet   Class: E-Prescribe   Order: [id number]   E-Prescribing Status: Receipt confirmed by pharmacy (5/24/2023  2:09 PM CDT)

## 2023-06-07 ENCOUNTER — TELEPHONE (OUTPATIENT)
Dept: FAMILY MEDICINE | Facility: OTHER | Age: 58
End: 2023-06-07

## 2023-06-07 DIAGNOSIS — E03.9 ACQUIRED HYPOTHYROIDISM: Primary | ICD-10-CM

## 2023-06-07 RX ORDER — LEVOTHYROXINE SODIUM 137 UG/1
137 TABLET ORAL DAILY
Qty: 90 TABLET | Refills: 1 | Status: SHIPPED | OUTPATIENT
Start: 2023-06-07 | End: 2023-11-30

## 2023-08-13 DIAGNOSIS — M62.830 BACK MUSCLE SPASM: ICD-10-CM

## 2023-08-13 DIAGNOSIS — F33.1 MODERATE EPISODE OF RECURRENT MAJOR DEPRESSIVE DISORDER (H): ICD-10-CM

## 2023-08-14 NOTE — TELEPHONE ENCOUNTER
Lexapro      Last Written Prescription Date:  05/24/2023  Last Fill Quantity: 90,   # refills: 0  Last Office Visit: 09/24/2021  Future Office visit:       Routing refill request to provider for review/approval because:

## 2023-08-15 RX ORDER — ESCITALOPRAM OXALATE 20 MG/1
TABLET ORAL
Qty: 90 TABLET | Refills: 0 | Status: SHIPPED | OUTPATIENT
Start: 2023-08-15 | End: 2023-11-30

## 2023-11-26 DIAGNOSIS — E03.9 ACQUIRED HYPOTHYROIDISM: ICD-10-CM

## 2023-11-26 DIAGNOSIS — F33.1 MODERATE EPISODE OF RECURRENT MAJOR DEPRESSIVE DISORDER (H): ICD-10-CM

## 2023-11-26 DIAGNOSIS — M62.830 BACK MUSCLE SPASM: ICD-10-CM

## 2023-11-30 RX ORDER — LEVOTHYROXINE SODIUM 137 UG/1
137 TABLET ORAL DAILY
Qty: 90 TABLET | Refills: 0 | Status: SHIPPED | OUTPATIENT
Start: 2023-11-30 | End: 2024-03-05

## 2023-11-30 RX ORDER — ESCITALOPRAM OXALATE 20 MG/1
TABLET ORAL
Qty: 90 TABLET | Refills: 0 | Status: SHIPPED | OUTPATIENT
Start: 2023-11-30 | End: 2024-03-05

## 2023-12-16 NOTE — TELEPHONE ENCOUNTER
Levothyroxine       Last Written Prescription Date:  06/07/2023  Last Fill Quantity: 90,   # refills: 1  Last Office Visit: 9/24/2021  Future Office visit:         Lexapro       Last Written Prescription Date:  8/15/2023  Last Fill Quantity: 90,   # refills: 0  Last Office Visit: 9/24/2021  Future Office visit:          DC instructions

## 2024-03-03 DIAGNOSIS — M62.830 BACK MUSCLE SPASM: ICD-10-CM

## 2024-03-03 DIAGNOSIS — E03.9 ACQUIRED HYPOTHYROIDISM: ICD-10-CM

## 2024-03-03 DIAGNOSIS — F33.1 MODERATE EPISODE OF RECURRENT MAJOR DEPRESSIVE DISORDER (H): ICD-10-CM

## 2024-03-04 NOTE — TELEPHONE ENCOUNTER
Levothyroxine Sodium 137 MCG Oral Tablet       Last Written Prescription Date:  11/30/2023  Last Fill Quantity: 90,   # refills: 0  Last Office Visit: 924/2021  Future Office visit:       Routing refill request to provider for review/approval because:    Thyroid Protocol [id number]/03/2024 02:22 PM   Protocol Details Recent (12 mo) or future (30 days) visit within the authorizing provider's specialty    Normal TSH on file in past 12 months          Escitalopram Oxalate 20 MG Oral Tablet       Last Written Prescription Date:  11/30/2023  Last Fill Quantity: 90,   # refills: 0  Last Office Visit: 9/24/2021  Future Office visit:       Routing refill request to provider for review/approval because:    SSRIs Protocol [id number]/03/2024 02:22 PM   Protocol Details PHQ-9 score less than 5 in past 6 months    Recent (6 mo) or future (30 days) visit within the authorizing provider's specialty          Kimberly Boecker, RN

## 2024-03-04 NOTE — TELEPHONE ENCOUNTER
Levothyroxine      Last Written Prescription Date:  11/30/2023  Last Fill Quantity: 90,   # refills: 0  Last Office Visit: 9/24/2021  Future Office visit:         Lexapro      Last Written Prescription Date:  11/30/2023  Last Fill Quantity: 90,   # refills: 0  Last Office Visit: 9/24/2021  Future Office visit:

## 2024-03-05 RX ORDER — LEVOTHYROXINE SODIUM 137 UG/1
137 TABLET ORAL DAILY
Qty: 90 TABLET | Refills: 0 | Status: SHIPPED | OUTPATIENT
Start: 2024-03-05 | End: 2024-06-26

## 2024-03-05 RX ORDER — ESCITALOPRAM OXALATE 20 MG/1
TABLET ORAL
Qty: 90 TABLET | Refills: 0 | Status: SHIPPED | OUTPATIENT
Start: 2024-03-05 | End: 2024-06-26

## 2024-06-25 DIAGNOSIS — M62.830 BACK MUSCLE SPASM: ICD-10-CM

## 2024-06-25 DIAGNOSIS — F33.1 MODERATE EPISODE OF RECURRENT MAJOR DEPRESSIVE DISORDER (H): ICD-10-CM

## 2024-06-25 DIAGNOSIS — E03.9 ACQUIRED HYPOTHYROIDISM: ICD-10-CM

## 2024-06-26 RX ORDER — LEVOTHYROXINE SODIUM 137 UG/1
137 TABLET ORAL DAILY
Qty: 90 TABLET | Refills: 0 | Status: SHIPPED | OUTPATIENT
Start: 2024-06-26 | End: 2024-10-04

## 2024-06-26 RX ORDER — ESCITALOPRAM OXALATE 20 MG/1
TABLET ORAL
Qty: 90 TABLET | Refills: 0 | Status: SHIPPED | OUTPATIENT
Start: 2024-06-26 | End: 2024-10-04

## 2024-06-26 NOTE — TELEPHONE ENCOUNTER
SSRIs Protocol [id number]/25/2024 09:48 PM   Protocol Details PHQ-9 score less than 5 in past 6 months    The patient must have completed an in-person or virtual visit. Urgent care and e-visits do not quality as an office visit for this protocol.          Thyroid Protocol [id number]/25/2024 09:48 PM   Protocol Details Recent (12 mo) or future (30 days) visit within the authorizing provider's specialty    Normal TSH on file in past 12 months

## 2024-06-26 NOTE — TELEPHONE ENCOUNTER
Lexapro      Last Written Prescription Date:  3/5/24  Last Fill Quantity: 90,   # refills: 0  Last Office Visit: 9/24/21  Future Office visit:       Routing refill request to provider for review/approval because:      Synthroid      Last Written Prescription Date:  3/5/24  Last Fill Quantity: 90,   # refills: 0  Last Office Visit: 9/24/21  Future Office visit:       Routing refill request to provider for review/approval because:

## 2024-07-06 ENCOUNTER — HEALTH MAINTENANCE LETTER (OUTPATIENT)
Age: 59
End: 2024-07-06

## 2024-09-29 DIAGNOSIS — E03.9 ACQUIRED HYPOTHYROIDISM: ICD-10-CM

## 2024-09-29 DIAGNOSIS — M62.830 BACK MUSCLE SPASM: ICD-10-CM

## 2024-09-29 DIAGNOSIS — F33.1 MODERATE EPISODE OF RECURRENT MAJOR DEPRESSIVE DISORDER (H): ICD-10-CM

## 2024-09-30 DIAGNOSIS — E03.9 ACQUIRED HYPOTHYROIDISM: Primary | ICD-10-CM

## 2024-09-30 NOTE — TELEPHONE ENCOUNTER
Levothyroxine Sodium 137 MCG Oral Tablet       Last Written Prescription Date:  06/26/2024  Last Fill Quantity: 90,   # refills: 0  Last Office Visit: 09/24/2021  Future Office visit:       Routing refill request to provider for review/approval because:    Thyroid Protocol [id number]/29/2024 03:48 PM   Protocol Details Recent (12 mo) or future (30 days) visit within the authorizing provider's specialty    Normal TSH on file in past 12 months        Escitalopram Oxalate 20 MG Oral Tablet       Last Written Prescription Date:  06/26/2024  Last Fill Quantity: 90,   # refills: 0  Last Office Visit: 09/24/2021  Future Office visit:       Routing refill request to provider for review/approval because:    SSRIs Protocol [id number]/29/2024 03:48 PM   Protocol Details PHQ-9 score less than 5 in past 6 months    Recent (12 mo) or future (90 days) visit within the authorizing provider's specialty        Kimberly Boecker, RN

## 2024-10-01 NOTE — PROGRESS NOTES
I ordered labs and needing a visit in clinic. Hx of breast cancer and needing her labs .  Would like to do them all if not seeing a oncology group. Will not refill today. Once we get her an appointment we will fill this.

## 2024-10-04 RX ORDER — ESCITALOPRAM OXALATE 20 MG/1
TABLET ORAL
Qty: 90 TABLET | Refills: 0 | Status: SHIPPED | OUTPATIENT
Start: 2024-10-04

## 2024-10-04 RX ORDER — LEVOTHYROXINE SODIUM 137 UG/1
137 TABLET ORAL DAILY
Qty: 90 TABLET | Refills: 0 | Status: SHIPPED | OUTPATIENT
Start: 2024-10-04

## 2024-12-04 ENCOUNTER — OFFICE VISIT (OUTPATIENT)
Dept: FAMILY MEDICINE | Facility: OTHER | Age: 59
End: 2024-12-04
Attending: PHYSICIAN ASSISTANT

## 2024-12-04 VITALS
OXYGEN SATURATION: 96 % | RESPIRATION RATE: 16 BRPM | SYSTOLIC BLOOD PRESSURE: 137 MMHG | HEART RATE: 61 BPM | WEIGHT: 217.5 LBS | DIASTOLIC BLOOD PRESSURE: 85 MMHG | BODY MASS INDEX: 32.22 KG/M2 | TEMPERATURE: 96.9 F | HEIGHT: 69 IN

## 2024-12-04 DIAGNOSIS — F33.1 MODERATE EPISODE OF RECURRENT MAJOR DEPRESSIVE DISORDER (H): ICD-10-CM

## 2024-12-04 DIAGNOSIS — M85.88 OSTEOPENIA OF LUMBAR SPINE: ICD-10-CM

## 2024-12-04 DIAGNOSIS — R30.0 DYSURIA: ICD-10-CM

## 2024-12-04 DIAGNOSIS — Z79.811 USE OF AROMATASE INHIBITORS: ICD-10-CM

## 2024-12-04 DIAGNOSIS — E83.51 HYPOCALCEMIA: ICD-10-CM

## 2024-12-04 DIAGNOSIS — E03.9 ACQUIRED HYPOTHYROIDISM: Primary | ICD-10-CM

## 2024-12-04 DIAGNOSIS — E89.0 H/O PARTIAL THYROIDECTOMY: ICD-10-CM

## 2024-12-04 DIAGNOSIS — M62.830 BACK MUSCLE SPASM: ICD-10-CM

## 2024-12-04 LAB
ALBUMIN SERPL BCG-MCNC: 3.8 G/DL (ref 3.5–5.2)
ALBUMIN UR-MCNC: 10 MG/DL
ALP SERPL-CCNC: 140 U/L (ref 40–150)
ALT SERPL W P-5'-P-CCNC: 17 U/L (ref 0–50)
ANION GAP SERPL CALCULATED.3IONS-SCNC: 11 MMOL/L (ref 7–15)
APPEARANCE UR: CLEAR
AST SERPL W P-5'-P-CCNC: 23 U/L (ref 0–45)
BASOPHILS # BLD AUTO: 0 10E3/UL (ref 0–0.2)
BASOPHILS NFR BLD AUTO: 0 %
BILIRUB SERPL-MCNC: 0.2 MG/DL
BILIRUB UR QL STRIP: NEGATIVE
BUN SERPL-MCNC: 7.3 MG/DL (ref 8–23)
CALCIUM SERPL-MCNC: 8.5 MG/DL (ref 8.8–10.4)
CHLORIDE SERPL-SCNC: 105 MMOL/L (ref 98–107)
COLOR UR AUTO: YELLOW
CREAT SERPL-MCNC: 0.77 MG/DL (ref 0.51–0.95)
EGFRCR SERPLBLD CKD-EPI 2021: 88 ML/MIN/1.73M2
EOSINOPHIL # BLD AUTO: 0.1 10E3/UL (ref 0–0.7)
EOSINOPHIL NFR BLD AUTO: 1 %
ERYTHROCYTE [DISTWIDTH] IN BLOOD BY AUTOMATED COUNT: 11.9 % (ref 10–15)
GLUCOSE SERPL-MCNC: 137 MG/DL (ref 70–99)
GLUCOSE UR STRIP-MCNC: NEGATIVE MG/DL
HCO3 SERPL-SCNC: 25 MMOL/L (ref 22–29)
HCT VFR BLD AUTO: 37.6 % (ref 35–47)
HGB BLD-MCNC: 12.4 G/DL (ref 11.7–15.7)
HGB UR QL STRIP: NEGATIVE
HYALINE CASTS: 3 /LPF
IMM GRANULOCYTES # BLD: 0 10E3/UL
IMM GRANULOCYTES NFR BLD: 0 %
KETONES UR STRIP-MCNC: NEGATIVE MG/DL
LEUKOCYTE ESTERASE UR QL STRIP: ABNORMAL
LYMPHOCYTES # BLD AUTO: 2.3 10E3/UL (ref 0.8–5.3)
LYMPHOCYTES NFR BLD AUTO: 37 %
MCH RBC QN AUTO: 30.1 PG (ref 26.5–33)
MCHC RBC AUTO-ENTMCNC: 33 G/DL (ref 31.5–36.5)
MCV RBC AUTO: 91 FL (ref 78–100)
MONOCYTES # BLD AUTO: 0.5 10E3/UL (ref 0–1.3)
MONOCYTES NFR BLD AUTO: 8 %
MUCOUS THREADS #/AREA URNS LPF: PRESENT /LPF
NEUTROPHILS # BLD AUTO: 3.4 10E3/UL (ref 1.6–8.3)
NEUTROPHILS NFR BLD AUTO: 54 %
NITRATE UR QL: NEGATIVE
NRBC # BLD AUTO: 0 10E3/UL
NRBC BLD AUTO-RTO: 0 /100
PH UR STRIP: 5.5 [PH] (ref 4.7–8)
PLATELET # BLD AUTO: 229 10E3/UL (ref 150–450)
POTASSIUM SERPL-SCNC: 3.5 MMOL/L (ref 3.4–5.3)
PROT SERPL-MCNC: 6.7 G/DL (ref 6.4–8.3)
RBC # BLD AUTO: 4.12 10E6/UL (ref 3.8–5.2)
RBC URINE: 2 /HPF
SODIUM SERPL-SCNC: 141 MMOL/L (ref 135–145)
SP GR UR STRIP: 1.03 (ref 1–1.03)
SQUAMOUS EPITHELIAL: 2 /HPF
TSH SERPL DL<=0.005 MIU/L-ACNC: 0.54 UIU/ML (ref 0.3–4.2)
UROBILINOGEN UR STRIP-MCNC: 2 MG/DL
WBC # BLD AUTO: 6.3 10E3/UL (ref 4–11)
WBC URINE: 7 /HPF

## 2024-12-04 RX ORDER — CIPROFLOXACIN 250 MG/1
250 TABLET, FILM COATED ORAL 2 TIMES DAILY
Qty: 6 TABLET | Refills: 0 | Status: SHIPPED | OUTPATIENT
Start: 2024-12-04

## 2024-12-04 RX ORDER — ESCITALOPRAM OXALATE 20 MG/1
20 TABLET ORAL DAILY
Qty: 90 TABLET | Refills: 3 | Status: SHIPPED | OUTPATIENT
Start: 2024-12-04

## 2024-12-04 RX ORDER — MELATONIN 3 MG
10 TABLET ORAL AT BEDTIME
COMMUNITY
Start: 2024-12-04

## 2024-12-04 RX ORDER — LEVOTHYROXINE SODIUM 137 UG/1
137 TABLET ORAL DAILY
Qty: 90 TABLET | Refills: 3 | Status: SHIPPED | OUTPATIENT
Start: 2024-12-04

## 2024-12-04 ASSESSMENT — ANXIETY QUESTIONNAIRES
IF YOU CHECKED OFF ANY PROBLEMS ON THIS QUESTIONNAIRE, HOW DIFFICULT HAVE THESE PROBLEMS MADE IT FOR YOU TO DO YOUR WORK, TAKE CARE OF THINGS AT HOME, OR GET ALONG WITH OTHER PEOPLE: SOMEWHAT DIFFICULT
7. FEELING AFRAID AS IF SOMETHING AWFUL MIGHT HAPPEN: MORE THAN HALF THE DAYS
GAD7 TOTAL SCORE: 13
1. FEELING NERVOUS, ANXIOUS, OR ON EDGE: MORE THAN HALF THE DAYS
2. NOT BEING ABLE TO STOP OR CONTROL WORRYING: NEARLY EVERY DAY
8. IF YOU CHECKED OFF ANY PROBLEMS, HOW DIFFICULT HAVE THESE MADE IT FOR YOU TO DO YOUR WORK, TAKE CARE OF THINGS AT HOME, OR GET ALONG WITH OTHER PEOPLE?: SOMEWHAT DIFFICULT
6. BECOMING EASILY ANNOYED OR IRRITABLE: SEVERAL DAYS
7. FEELING AFRAID AS IF SOMETHING AWFUL MIGHT HAPPEN: MORE THAN HALF THE DAYS
3. WORRYING TOO MUCH ABOUT DIFFERENT THINGS: MORE THAN HALF THE DAYS
GAD7 TOTAL SCORE: 13
5. BEING SO RESTLESS THAT IT IS HARD TO SIT STILL: SEVERAL DAYS
GAD7 TOTAL SCORE: 13
4. TROUBLE RELAXING: MORE THAN HALF THE DAYS

## 2024-12-04 ASSESSMENT — PATIENT HEALTH QUESTIONNAIRE - PHQ9
10. IF YOU CHECKED OFF ANY PROBLEMS, HOW DIFFICULT HAVE THESE PROBLEMS MADE IT FOR YOU TO DO YOUR WORK, TAKE CARE OF THINGS AT HOME, OR GET ALONG WITH OTHER PEOPLE: SOMEWHAT DIFFICULT
SUM OF ALL RESPONSES TO PHQ QUESTIONS 1-9: 15
SUM OF ALL RESPONSES TO PHQ QUESTIONS 1-9: 15

## 2024-12-04 NOTE — PROGRESS NOTES
"  Assessment & Plan     Acquired hypothyroidism  She will be given below. Her labs are good. Her calcium is lower hx of partial thyroidectomy.    - TSH with free T4 reflex; Future  - CBC with platelets and differential; Future  - Comprehensive metabolic panel (BMP + Alb, Alk Phos, ALT, AST, Total. Bili, TP); Future  - UA reflex to Microscopic; Future  - UA reflex to Microscopic  - Comprehensive metabolic panel (BMP + Alb, Alk Phos, ALT, AST, Total. Bili, TP)  - CBC with platelets and differential  - TSH with free T4 reflex    H/O partial thyroidectomy  She is going to increase her calcium.  Checking PTH.   - Comprehensive metabolic panel (BMP + Alb, Alk Phos, ALT, AST, Total. Bili, TP); Future  - UA reflex to Microscopic; Future  - UA reflex to Microscopic  - Comprehensive metabolic panel (BMP + Alb, Alk Phos, ALT, AST, Total. Bili, TP)    Use of aromatase inhibitors  Off due to her loss of insurance.      Osteopenia of lumbar spine  She is going to be given refill and labs.   - TSH with free T4 reflex; Future  - CBC with platelets and differential; Future  - Comprehensive metabolic panel (BMP + Alb, Alk Phos, ALT, AST, Total. Bili, TP); Future  - Comprehensive metabolic panel (BMP + Alb, Alk Phos, ALT, AST, Total. Bili, TP)  - CBC with platelets and differential  - TSH with free T4 reflex          BMI  Estimated body mass index is 32.12 kg/m  as calculated from the following:    Height as of this encounter: 1.753 m (5' 9\").    Weight as of this encounter: 98.7 kg (217 lb 8 oz).   Weight management plan: Discussed healthy diet and exercise guidelines      See Patient Instructions    No follow-ups on file.    Humaira Magana is a 59 year old, presenting for the following health issues:  Thyroid Problem and Depression        12/4/2024     4:30 PM   Additional Questions   Roomed by abdi franks   Accompanied by none         12/4/2024     4:30 PM   Patient Reported Additional Medications   Patient reports taking the " following new medications none     HPI     Depression   How are you doing with your depression since your last visit? No change  Are you having other symptoms that might be associated with depression? No  Have you had a significant life event?  No   Are you feeling anxious or having panic attacks?   Yes:     Do you have any concerns with your use of alcohol or other drugs? No    Social History     Tobacco Use    Smoking status: Never    Smokeless tobacco: Never    Tobacco comments:     no passive exposure   Substance Use Topics    Alcohol use: Not Currently     Comment: rarely    Drug use: No         10/23/2020    11:00 AM 9/24/2021     8:00 AM 12/4/2024     4:03 PM   PHQ   PHQ-9 Total Score 0 0 15    Q9: Thoughts of better off dead/self-harm past 2 weeks Not at all Not at all Not at all        Patient-reported         10/23/2020    11:00 AM 9/24/2021     8:00 AM 12/4/2024     4:04 PM   MITCH-7 SCORE   Total Score   13 (moderate anxiety)   Total Score 0 0 13        Patient-reported         12/4/2024     4:03 PM   Last PHQ-9   1.  Little interest or pleasure in doing things 2    2.  Feeling down, depressed, or hopeless 2    3.  Trouble falling or staying asleep, or sleeping too much 2    4.  Feeling tired or having little energy 2    5.  Poor appetite or overeating 3    6.  Feeling bad about yourself 2    7.  Trouble concentrating 1    8.  Moving slowly or restless 1    Q9: Thoughts of better off dead/self-harm past 2 weeks 0    PHQ-9 Total Score 15        Patient-reported       Suicide Assessment Five-step Evaluation and Treatment (SAFE-T)      Hypothyroidism Follow-up    Since last visit, patient describes the following symptoms: Weight stable, no hair loss, no skin changes, no constipation, no loose stools          Review of Systems  Constitutional, neuro, ENT, endocrine, pulmonary, cardiac, gastrointestinal, genitourinary, musculoskeletal, integument and psychiatric systems are negative, except as otherwise  "noted.      Objective    /85 (BP Location: Right arm, Patient Position: Sitting, Cuff Size: Adult Large)   Pulse 61   Temp 96.9  F (36.1  C) (Tympanic)   Resp 16   Ht 1.753 m (5' 9\")   Wt 98.7 kg (217 lb 8 oz)   SpO2 96%   BMI 32.12 kg/m    Body mass index is 32.12 kg/m .  Physical Exam   GENERAL: alert and no distress  EYES: Eyes grossly normal to inspection, PERRL and conjunctivae and sclerae normal  HENT: ear canals and TM's normal, nose and mouth without ulcers or lesions  NECK: no adenopathy, no asymmetry, masses, or scars  RESP: lungs clear to auscultation - no rales, rhonchi or wheezes  BREAST: normal without masses, tenderness or nipple discharge and no palpable axillary masses or adenopathy  CV: regular rate and rhythm, normal S1 S2, no S3 or S4, no murmur, click or rub, no peripheral edema  ABDOMEN: soft, nontender, no hepatosplenomegaly, no masses and bowel sounds normal  MS: no gross musculoskeletal defects noted, no edema  SKIN: no suspicious lesions or rashes  NEURO: Normal strength and tone, mentation intact and speech normal  PSYCH: mentation appears normal, affect normal/bright  LYMPH: no cervical, supraclavicular, axillary, or inguinal adenopathy    Results for orders placed or performed in visit on 12/04/24   UA reflex to Microscopic     Status: Abnormal   Result Value Ref Range    Color Urine Yellow Colorless, Straw, Light Yellow, Yellow    Appearance Urine Clear Clear    Glucose Urine Negative Negative mg/dL    Bilirubin Urine Negative Negative    Ketones Urine Negative Negative mg/dL    Specific Gravity Urine 1.026 1.003 - 1.035    Blood Urine Negative Negative    pH Urine 5.5 4.7 - 8.0    Protein Albumin Urine 10 (A) Negative mg/dL    Urobilinogen Urine 2.0 Normal, 2.0 mg/dL    Nitrite Urine Negative Negative    Leukocyte Esterase Urine Moderate (A) Negative    RBC Urine 2 <=2 /HPF    WBC Urine 7 (H) <=5 /HPF    Squamous Epithelials Urine 2 (H) <=1 /HPF    Mucus Urine Present (A) " None Seen /LPF    Hyaline Casts Urine 3 (H) <=2 /LPF   Comprehensive metabolic panel (BMP + Alb, Alk Phos, ALT, AST, Total. Bili, TP)     Status: Abnormal   Result Value Ref Range    Sodium 141 135 - 145 mmol/L    Potassium 3.5 3.4 - 5.3 mmol/L    Carbon Dioxide (CO2) 25 22 - 29 mmol/L    Anion Gap 11 7 - 15 mmol/L    Urea Nitrogen 7.3 (L) 8.0 - 23.0 mg/dL    Creatinine 0.77 0.51 - 0.95 mg/dL    GFR Estimate 88 >60 mL/min/1.73m2    Calcium 8.5 (L) 8.8 - 10.4 mg/dL    Chloride 105 98 - 107 mmol/L    Glucose 137 (H) 70 - 99 mg/dL    Alkaline Phosphatase 140 40 - 150 U/L    AST 23 0 - 45 U/L    ALT 17 0 - 50 U/L    Protein Total 6.7 6.4 - 8.3 g/dL    Albumin 3.8 3.5 - 5.2 g/dL    Bilirubin Total 0.2 <=1.2 mg/dL   TSH with free T4 reflex     Status: Normal   Result Value Ref Range    TSH 0.54 0.30 - 4.20 uIU/mL   CBC with platelets and differential     Status: None   Result Value Ref Range    WBC Count 6.3 4.0 - 11.0 10e3/uL    RBC Count 4.12 3.80 - 5.20 10e6/uL    Hemoglobin 12.4 11.7 - 15.7 g/dL    Hematocrit 37.6 35.0 - 47.0 %    MCV 91 78 - 100 fL    MCH 30.1 26.5 - 33.0 pg    MCHC 33.0 31.5 - 36.5 g/dL    RDW 11.9 10.0 - 15.0 %    Platelet Count 229 150 - 450 10e3/uL    % Neutrophils 54 %    % Lymphocytes 37 %    % Monocytes 8 %    % Eosinophils 1 %    % Basophils 0 %    % Immature Granulocytes 0 %    NRBCs per 100 WBC 0 <1 /100    Absolute Neutrophils 3.4 1.6 - 8.3 10e3/uL    Absolute Lymphocytes 2.3 0.8 - 5.3 10e3/uL    Absolute Monocytes 0.5 0.0 - 1.3 10e3/uL    Absolute Eosinophils 0.1 0.0 - 0.7 10e3/uL    Absolute Basophils 0.0 0.0 - 0.2 10e3/uL    Absolute Immature Granulocytes 0.0 <=0.4 10e3/uL    Absolute NRBCs 0.0 10e3/uL   CBC with platelets and differential     Status: None    Narrative    The following orders were created for panel order CBC with platelets and differential.  Procedure                               Abnormality         Status                     ---------                                -----------         ------                     CBC with platelets and d...[[id number]]                      Final result                 Please view results for these tests on the individual orders.           Signed Electronically by: JOSE Madera

## 2024-12-15 DIAGNOSIS — E03.9 ACQUIRED HYPOTHYROIDISM: ICD-10-CM

## 2024-12-15 RX ORDER — LEVOTHYROXINE SODIUM 137 UG/1
137 TABLET ORAL DAILY
Qty: 90 TABLET | Refills: 3 | Status: SHIPPED | OUTPATIENT
Start: 2024-12-15

## 2024-12-19 ENCOUNTER — TELEPHONE (OUTPATIENT)
Dept: FAMILY MEDICINE | Facility: OTHER | Age: 59
End: 2024-12-19
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
## 2024-12-26 DIAGNOSIS — R73.03 PREDIABETES: Primary | ICD-10-CM

## 2025-03-05 ENCOUNTER — OFFICE VISIT (OUTPATIENT)
Dept: FAMILY MEDICINE | Facility: OTHER | Age: 60
End: 2025-03-05
Attending: PHYSICIAN ASSISTANT
Payer: COMMERCIAL

## 2025-03-05 VITALS
TEMPERATURE: 97.4 F | BODY MASS INDEX: 31.37 KG/M2 | RESPIRATION RATE: 16 BRPM | SYSTOLIC BLOOD PRESSURE: 132 MMHG | HEART RATE: 78 BPM | OXYGEN SATURATION: 96 % | WEIGHT: 211.8 LBS | HEIGHT: 69 IN | DIASTOLIC BLOOD PRESSURE: 82 MMHG

## 2025-03-05 DIAGNOSIS — C50.512 MALIGNANT NEOPLASM OF LOWER-OUTER QUADRANT OF LEFT BREAST OF FEMALE, ESTROGEN RECEPTOR POSITIVE (H): Primary | ICD-10-CM

## 2025-03-05 DIAGNOSIS — Z17.0 MALIGNANT NEOPLASM OF LOWER-OUTER QUADRANT OF LEFT BREAST OF FEMALE, ESTROGEN RECEPTOR POSITIVE (H): Primary | ICD-10-CM

## 2025-03-05 DIAGNOSIS — E03.9 ACQUIRED HYPOTHYROIDISM: ICD-10-CM

## 2025-03-05 DIAGNOSIS — Z12.31 VISIT FOR SCREENING MAMMOGRAM: ICD-10-CM

## 2025-03-05 DIAGNOSIS — M62.830 BACK MUSCLE SPASM: ICD-10-CM

## 2025-03-05 DIAGNOSIS — Z87.42 HISTORY OF DYSFUNCTIONAL UTERINE BLEEDING: ICD-10-CM

## 2025-03-05 DIAGNOSIS — F33.1 MODERATE EPISODE OF RECURRENT MAJOR DEPRESSIVE DISORDER (H): ICD-10-CM

## 2025-03-05 DIAGNOSIS — Z01.419 WELL WOMAN EXAM WITH ROUTINE GYNECOLOGICAL EXAM: ICD-10-CM

## 2025-03-05 RX ORDER — LEVOTHYROXINE SODIUM 137 UG/1
137 TABLET ORAL DAILY
Qty: 90 TABLET | Refills: 3 | Status: SHIPPED | OUTPATIENT
Start: 2025-03-05

## 2025-03-05 RX ORDER — ESCITALOPRAM OXALATE 20 MG/1
20 TABLET ORAL DAILY
Qty: 90 TABLET | Refills: 3 | Status: SHIPPED | OUTPATIENT
Start: 2025-03-05

## 2025-03-05 SDOH — HEALTH STABILITY: PHYSICAL HEALTH: ON AVERAGE, HOW MANY DAYS PER WEEK DO YOU ENGAGE IN MODERATE TO STRENUOUS EXERCISE (LIKE A BRISK WALK)?: 0 DAYS

## 2025-03-05 ASSESSMENT — PATIENT HEALTH QUESTIONNAIRE - PHQ9
SUM OF ALL RESPONSES TO PHQ QUESTIONS 1-9: 10
SUM OF ALL RESPONSES TO PHQ QUESTIONS 1-9: 10
10. IF YOU CHECKED OFF ANY PROBLEMS, HOW DIFFICULT HAVE THESE PROBLEMS MADE IT FOR YOU TO DO YOUR WORK, TAKE CARE OF THINGS AT HOME, OR GET ALONG WITH OTHER PEOPLE: SOMEWHAT DIFFICULT

## 2025-03-05 ASSESSMENT — SOCIAL DETERMINANTS OF HEALTH (SDOH): HOW OFTEN DO YOU GET TOGETHER WITH FRIENDS OR RELATIVES?: NEVER

## 2025-03-05 ASSESSMENT — PAIN SCALES - GENERAL: PAINLEVEL_OUTOF10: NO PAIN (0)
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
## 2025-03-10 LAB
BKR AP ASSOCIATED HPV REPORT: NORMAL
BKR LAB AP GYN ADEQUACY: NORMAL
BKR LAB AP GYN INTERPRETATION: NORMAL
BKR LAB AP PREVIOUS ABNORMAL: NORMAL
PATH REPORT.COMMENTS IMP SPEC: NORMAL
PATH REPORT.COMMENTS IMP SPEC: NORMAL
PATH REPORT.RELEVANT HX SPEC: NORMAL

## 2025-04-10 ENCOUNTER — TELEPHONE (OUTPATIENT)
Dept: FAMILY MEDICINE | Facility: OTHER | Age: 60
End: 2025-04-10

## 2025-04-10 NOTE — TELEPHONE ENCOUNTER
[facility] Consent / Enrollement packet and [facility] Pap Summary has been completed and faxed to [phone]. Packet has also been sent to [facility] on 04/10/25.

## 2025-09-03 ENCOUNTER — TELEPHONE (OUTPATIENT)
Dept: FAMILY MEDICINE | Facility: OTHER | Age: 60
End: 2025-09-03